# Patient Record
Sex: FEMALE | Race: WHITE | NOT HISPANIC OR LATINO | Employment: FULL TIME | URBAN - METROPOLITAN AREA
[De-identification: names, ages, dates, MRNs, and addresses within clinical notes are randomized per-mention and may not be internally consistent; named-entity substitution may affect disease eponyms.]

---

## 2019-08-09 ENCOUNTER — OFFICE VISIT (OUTPATIENT)
Dept: FAMILY MEDICINE CLINIC | Facility: CLINIC | Age: 49
End: 2019-08-09

## 2019-08-09 VITALS
RESPIRATION RATE: 16 BRPM | HEIGHT: 64 IN | TEMPERATURE: 98.8 F | HEART RATE: 95 BPM | WEIGHT: 106.8 LBS | DIASTOLIC BLOOD PRESSURE: 60 MMHG | BODY MASS INDEX: 18.23 KG/M2 | SYSTOLIC BLOOD PRESSURE: 114 MMHG | OXYGEN SATURATION: 98 %

## 2019-08-09 DIAGNOSIS — Z72.0 NICOTINE ABUSE: ICD-10-CM

## 2019-08-09 DIAGNOSIS — J90 PLEURISY WITH EFFUSION: Primary | ICD-10-CM

## 2019-08-09 DIAGNOSIS — B37.0 THRUSH, ORAL: ICD-10-CM

## 2019-08-09 PROCEDURE — 99213 OFFICE O/P EST LOW 20 MIN: CPT | Performed by: NURSE PRACTITIONER

## 2019-08-09 RX ORDER — VARENICLINE TARTRATE 0.5 MG/1
0.5 TABLET, FILM COATED ORAL 2 TIMES DAILY
Qty: 60 TABLET | Refills: 0 | Status: SHIPPED | OUTPATIENT
Start: 2019-08-09 | End: 2020-10-05

## 2019-08-09 RX ORDER — VARENICLINE TARTRATE 1 MG/1
1 TABLET, FILM COATED ORAL 2 TIMES DAILY
Qty: 60 TABLET | Refills: 5 | Status: SHIPPED | OUTPATIENT
Start: 2019-08-09 | End: 2019-09-20

## 2019-08-09 RX ORDER — DICLOFENAC SODIUM 75 MG/1
75 TABLET, DELAYED RELEASE ORAL 2 TIMES DAILY
Qty: 60 TABLET | Refills: 0 | Status: SHIPPED | OUTPATIENT
Start: 2019-08-09 | End: 2019-09-20

## 2019-08-09 RX ORDER — LEVOFLOXACIN 500 MG/1
500 TABLET, FILM COATED ORAL DAILY
Qty: 7 TABLET | Refills: 0 | Status: SHIPPED | OUTPATIENT
Start: 2019-08-09 | End: 2019-09-20

## 2019-08-09 NOTE — PROGRESS NOTES
Subjective   Radha Vasquez is a 49 y.o. female.     Chief Complaint   Patient presents with   • Establish Care   • Follow-up     [facility] 08/03/2019 for Pleurisy.         History of Present Illness   Patient Is here today to establish care with [facility] and for follow up on pleurisy.  [facility]- diagnosed with pleurisy.  Was given antibiotics and steroids,   Chest congestion is better, but pain is still there.    OTC: ibuprofen. With a little relief.    Heating pad.      Also feels like she is getting thrush.  Tongue is sore.       The following portions of the patient's history were reviewed and updated as appropriate: allergies, current medications, past family history, past medical history, past social history, past surgical history and problem list.    History reviewed. No pertinent past medical history.    Past Surgical History:   Procedure Laterality Date   • COLONOSCOPY     • TUBAL ABDOMINAL LIGATION         Family History   Problem Relation Age of Onset   • Hypertension Mother    • Osteoporosis Mother        Social History     Socioeconomic History   • Marital status: Single     Spouse name: Not on file   • Number of children: Not on file   • Years of education: Not on file   • Highest education level: Not on file   Tobacco Use   • Smoking status: Light Tobacco Smoker   • Smokeless tobacco: Never Used   Substance and Sexual Activity   • Alcohol use: Yes     Comment: rare       Review of Systems   Constitutional: Negative.    HENT: Positive for congestion and mouth sores. Negative for sore throat.    Respiratory: Positive for cough, chest tightness, shortness of breath and wheezing.         Chest tightness and pain in R side of lung   Cardiovascular: Negative for chest pain, palpitations and leg swelling.       Objective   Vitals:    08/09/19 1550   BP: 114/60   BP Location: Left arm   Patient Position: Sitting   Cuff Size: Adult   Pulse: 95   Resp: 16   Temp: 98.8 °F (37.1 °C)   TempSrc: Oral  "  SpO2: 98%   Weight: 48.4 kg (106 lb 12.8 oz)   Height: 162.6 cm (64.02\")     Body mass index is 18.32 kg/m².  Physical Exam   Constitutional: She appears well-developed and well-nourished.   Eyes: Conjunctivae and EOM are normal. Pupils are equal, round, and reactive to light.   Cardiovascular: Normal rate, regular rhythm, normal heart sounds and intact distal pulses.   Pulmonary/Chest: No stridor. No respiratory distress. She has wheezes. She has no rales. She exhibits tenderness.         Assessment/Plan   Radha was seen today for establish care and follow-up.    Diagnoses and all orders for this visit:    Pleurisy with effusion    Nicotine abuse    Thrush, oral    Other orders  -     levoFLOXacin (LEVAQUIN) 500 MG tablet; Take 1 tablet by mouth Daily.  -     nystatin (MYCOSTATIN) 096206 UNIT/ML suspension; Swish and swallow 5 mL 4 (Four) Times a Day.  -     diclofenac (VOLTAREN) 75 MG EC tablet; Take 1 tablet by mouth 2 (Two) Times a Day.  -     varenicline (CHANTIX) 0.5 MG tablet; Take 1 tablet by mouth 2 (Two) Times a Day.  -     varenicline (CHANTIX CONTINUING MONTH TONIA) 1 MG tablet; Take 1 tablet by mouth 2 (Two) Times a Day.                 Patient Instructions   Pleurisy  Pleurisy is irritation and swelling (inflammation) of the linings of your lungs (pleura). This can cause pain in your chest, back, or shoulder. It can also cause trouble breathing.  Follow these instructions at home:  Medicines  · Take over-the-counter and prescription medicines only as told by your doctor.  · If you were prescribed antibiotic medicine, take it as told by your doctor. Do not stop taking the antibiotic even if you start to feel better.  Activity  · Rest and return to your normal activities as told by your doctor. Ask your doctor what activities are safe for you.  · Do not drive or use heavy machinery while taking prescription pain medicine.  General instructions    · Watch for any changes in your condition.  · Take " deep breaths often, even if it is painful. This can help prevent lung problems.  · When lying down, lie on your painful side. This may help you feel less pain.  · Do not smoke. If you need help quitting, ask your doctor.  · Keep all follow-up visits as told by your doctor. This is important.  Contact a doctor if:  · You have pain that:  ? Gets worse.  ? Does not get better with medicine.  ? Lasts for more than 1 week.  · You have a fever or chills.  · You have a cough that does not get better at home.  · You have trouble breathing that does not get better at home.  · You cough up liquid that looks like pus (purulent secretions).  Get help right away if:  · Your lips, fingernails, or toenails turn dark or turn blue.  · You cough up blood.  · You have trouble breathing that gets worse.  · You are making loud noises when you breathe (wheezing) and this gets worse.  · You have pain that spreads to your neck, arms, or jaw.  · You get a rash.  · You throw up (vomit).  · You pass out (faint).  Summary  · Pleurisy is irritation and swelling (inflammation) of the linings of your lungs (pleura).  · Pleurisy can cause pain and trouble breathing.  · If you have a cough that does not get better at home, contact your doctor.  · Get help right away if you are having trouble breathing and it is getting worse.  This information is not intended to replace advice given to you by your health care provider. Make sure you discuss any questions you have with your health care provider.  Document Released: 11/30/2009 Document Revised: 09/11/2017 Document Reviewed: 09/11/2017  [employer] Interactive Patient Education © 2019 [employer]

## 2019-08-09 NOTE — PATIENT INSTRUCTIONS
Pleurisy  Pleurisy is irritation and swelling (inflammation) of the linings of your lungs (pleura). This can cause pain in your chest, back, or shoulder. It can also cause trouble breathing.  Follow these instructions at home:  Medicines  · Take over-the-counter and prescription medicines only as told by your doctor.  · If you were prescribed antibiotic medicine, take it as told by your doctor. Do not stop taking the antibiotic even if you start to feel better.  Activity  · Rest and return to your normal activities as told by your doctor. Ask your doctor what activities are safe for you.  · Do not drive or use heavy machinery while taking prescription pain medicine.  General instructions    · Watch for any changes in your condition.  · Take deep breaths often, even if it is painful. This can help prevent lung problems.  · When lying down, lie on your painful side. This may help you feel less pain.  · Do not smoke. If you need help quitting, ask your doctor.  · Keep all follow-up visits as told by your doctor. This is important.  Contact a doctor if:  · You have pain that:  ? Gets worse.  ? Does not get better with medicine.  ? Lasts for more than 1 week.  · You have a fever or chills.  · You have a cough that does not get better at home.  · You have trouble breathing that does not get better at home.  · You cough up liquid that looks like pus (purulent secretions).  Get help right away if:  · Your lips, fingernails, or toenails turn dark or turn blue.  · You cough up blood.  · You have trouble breathing that gets worse.  · You are making loud noises when you breathe (wheezing) and this gets worse.  · You have pain that spreads to your neck, arms, or jaw.  · You get a rash.  · You throw up (vomit).  · You pass out (faint).  Summary  · Pleurisy is irritation and swelling (inflammation) of the linings of your lungs (pleura).  · Pleurisy can cause pain and trouble breathing.  · If you have a cough that does not get  better at home, contact your doctor.  · Get help right away if you are having trouble breathing and it is getting worse.  This information is not intended to replace advice given to you by your health care provider. Make sure you discuss any questions you have with your health care provider.  Document Released: 11/30/2009 Document Revised: 09/11/2017 Document Reviewed: 09/11/2017  [employer] Interactive Patient Education © 2019 [employer]

## 2019-09-04 RX ORDER — FLUOXETINE 20 MG/1
20 TABLET, FILM COATED ORAL DAILY
Qty: 30 TABLET | Refills: 2 | Status: SHIPPED | OUTPATIENT
Start: 2019-09-04 | End: 2020-01-22

## 2019-09-04 RX ORDER — OMEPRAZOLE 40 MG/1
40 CAPSULE, DELAYED RELEASE ORAL DAILY
Qty: 30 CAPSULE | Refills: 2 | Status: SHIPPED | OUTPATIENT
Start: 2019-09-04 | End: 2020-01-22

## 2019-09-20 ENCOUNTER — OFFICE VISIT (OUTPATIENT)
Dept: FAMILY MEDICINE CLINIC | Facility: CLINIC | Age: 49
End: 2019-09-20

## 2019-09-20 VITALS
WEIGHT: 107.2 LBS | HEART RATE: 67 BPM | HEIGHT: 64 IN | SYSTOLIC BLOOD PRESSURE: 101 MMHG | DIASTOLIC BLOOD PRESSURE: 58 MMHG | BODY MASS INDEX: 18.3 KG/M2 | TEMPERATURE: 98.4 F | OXYGEN SATURATION: 98 %

## 2019-09-20 DIAGNOSIS — R09.1 PLEURISY: ICD-10-CM

## 2019-09-20 DIAGNOSIS — J40 BRONCHITIS: Primary | ICD-10-CM

## 2019-09-20 PROCEDURE — 99213 OFFICE O/P EST LOW 20 MIN: CPT | Performed by: NURSE PRACTITIONER

## 2019-09-20 NOTE — PATIENT INSTRUCTIONS
Pleurisy  Pleurisy is irritation and swelling (inflammation) of the linings of your lungs (pleura). This can cause pain in your chest, back, or shoulder. It can also cause trouble breathing.  Follow these instructions at home:  Medicines  · Take over-the-counter and prescription medicines only as told by your doctor.  · If you were prescribed antibiotic medicine, take it as told by your doctor. Do not stop taking the antibiotic even if you start to feel better.  Activity  · Rest and return to your normal activities as told by your doctor. Ask your doctor what activities are safe for you.  · Do not drive or use heavy machinery while taking prescription pain medicine.  General instructions    · Watch for any changes in your condition.  · Take deep breaths often, even if it is painful. This can help prevent lung problems.  · When lying down, lie on your painful side. This may help you feel less pain.  · Do not smoke. If you need help quitting, ask your doctor.  · Keep all follow-up visits as told by your doctor. This is important.  Contact a doctor if:  · You have pain that:  ? Gets worse.  ? Does not get better with medicine.  ? Lasts for more than 1 week.  · You have a fever or chills.  · You have a cough that does not get better at home.  · You have trouble breathing that does not get better at home.  · You cough up liquid that looks like pus (purulent secretions).  Get help right away if:  · Your lips, fingernails, or toenails turn dark or turn blue.  · You cough up blood.  · You have trouble breathing that gets worse.  · You are making loud noises when you breathe (wheezing) and this gets worse.  · You have pain that spreads to your neck, arms, or jaw.  · You get a rash.  · You throw up (vomit).  · You pass out (faint).  Summary  · Pleurisy is irritation and swelling (inflammation) of the linings of your lungs (pleura).  · Pleurisy can cause pain and trouble breathing.  · If you have a cough that does not get  better at home, contact your doctor.  · Get help right away if you are having trouble breathing and it is getting worse.  This information is not intended to replace advice given to you by your health care provider. Make sure you discuss any questions you have with your health care provider.  Document Released: 11/30/2009 Document Revised: 09/11/2017 Document Reviewed: 09/11/2017  [employer] Interactive Patient Education © 2019 [employer]      Continue use of diclofenac for muscle strain and possible pleurisy.  Will obtain x-rays of chest and call with the results.  Trial Breo for ongoing congestion and shortness of breath, as well as slight wheezes heard bilateral lung bases.  If any worsening symptoms notify provider.

## 2019-09-20 NOTE — PROGRESS NOTES
Subjective   Radha Vasquez is a 49 y.o. female.     Chief Complaint   Patient presents with   • Cough     pluracy pain, drainage, pain on right side        History of Present Illness     Patient is here today with c/o pleurisy pain that has continued.  It seems to be getting better, but has continued.  Also drainage and now having some pain in LLQ and flank pain.    Also stated toes on B feet and R fingers are going numb off and on.    Started Chantix last Saturday and has cut down smoking.  Hasn't set quit date yet.          The following portions of the patient's history were reviewed and updated as appropriate: allergies, current medications, past family history, past medical history, past social history, past surgical history and problem list.    History reviewed. No pertinent past medical history.    Past Surgical History:   Procedure Laterality Date   • COLONOSCOPY     • TUBAL ABDOMINAL LIGATION         Family History   Problem Relation Age of Onset   • Hypertension Mother    • Osteoporosis Mother        Social History     Socioeconomic History   • Marital status: Single     Spouse name: Not on file   • Number of children: Not on file   • Years of education: Not on file   • Highest education level: Not on file   Tobacco Use   • Smoking status: Light Tobacco Smoker   • Smokeless tobacco: Never Used   Substance and Sexual Activity   • Alcohol use: Yes     Comment: rare         Current Outpatient Medications:   •  estradiol (ESTRACE) 0.5 MG tablet, Take 0.5 mg by mouth Daily. as directed, Disp: , Rfl: 2  •  FLUoxetine (PROzac) 20 MG tablet, Take 1 tablet by mouth Daily., Disp: 30 tablet, Rfl: 2  •  omeprazole (priLOSEC) 40 MG capsule, Take 1 capsule by mouth Daily., Disp: 30 capsule, Rfl: 2  •  predniSONE (DELTASONE) 20 MG tablet, Take 1 tablet by mouth 2 (Two) Times a Day., Disp: 10 tablet, Rfl: 0  •  varenicline (CHANTIX) 0.5 MG tablet, Take 1 tablet by mouth 2 (Two) Times a Day., Disp: 60 tablet, Rfl:  "0  •  Fluticasone Furoate-Vilanterol (BREO ELLIPTA) 200-25 MCG/INH inhaler, Inhale 1 puff Daily., Disp: 60 each, Rfl: 2  •  vitamin D (ERGOCALCIFEROL) 78563 units capsule capsule, Take 50,000 Units by mouth 1 (One) Time Per Week., Disp: , Rfl: 0    Review of Systems   Constitutional: Positive for fatigue and fever.   HENT: Positive for congestion and rhinorrhea. Negative for ear discharge, ear pain, sneezing and sore throat.    Respiratory: Positive for chest tightness and wheezing. Negative for cough and shortness of breath.    Cardiovascular: Negative for chest pain.   Gastrointestinal: Negative for abdominal pain, constipation, diarrhea, nausea and vomiting.   Genitourinary: Positive for flank pain. Negative for dysuria, urgency and urinary incontinence.   Musculoskeletal: Negative for back pain.       Objective   Vitals:    09/20/19 0909   BP: 101/58   Pulse: 67   Temp: 98.4 °F (36.9 °C)   SpO2: 98%   Weight: 48.6 kg (107 lb 3.2 oz)   Height: 162.6 cm (64.02\")     Body mass index is 18.39 kg/m².  Physical Exam   Constitutional: She appears well-developed and well-nourished.   HENT:   Head: Normocephalic and atraumatic.   Right Ear: External ear normal.   Left Ear: External ear normal.   Nose: Nose normal.   Mouth/Throat: Oropharynx is clear and moist.   Cardiovascular: Normal rate, regular rhythm and normal heart sounds.   Pulmonary/Chest: Effort normal. No stridor. No respiratory distress. She has wheezes. She has no rales. She exhibits no tenderness.   Abdominal: Soft. Bowel sounds are normal. She exhibits no distension. There is tenderness (L flank slightly tender with palpation, muscular feeling. ).         Assessment/Plan   Radha was seen today for cough.    Diagnoses and all orders for this visit:    Bronchitis  -     XR Chest 2 View; Future    Pleurisy    Other orders  -     Fluticasone Furoate-Vilanterol (BREO ELLIPTA) 200-25 MCG/INH inhaler; Inhale 1 puff Daily.                 Patient Instructions "   Pleurisy  Pleurisy is irritation and swelling (inflammation) of the linings of your lungs (pleura). This can cause pain in your chest, back, or shoulder. It can also cause trouble breathing.  Follow these instructions at home:  Medicines  · Take over-the-counter and prescription medicines only as told by your doctor.  · If you were prescribed antibiotic medicine, take it as told by your doctor. Do not stop taking the antibiotic even if you start to feel better.  Activity  · Rest and return to your normal activities as told by your doctor. Ask your doctor what activities are safe for you.  · Do not drive or use heavy machinery while taking prescription pain medicine.  General instructions    · Watch for any changes in your condition.  · Take deep breaths often, even if it is painful. This can help prevent lung problems.  · When lying down, lie on your painful side. This may help you feel less pain.  · Do not smoke. If you need help quitting, ask your doctor.  · Keep all follow-up visits as told by your doctor. This is important.  Contact a doctor if:  · You have pain that:  ? Gets worse.  ? Does not get better with medicine.  ? Lasts for more than 1 week.  · You have a fever or chills.  · You have a cough that does not get better at home.  · You have trouble breathing that does not get better at home.  · You cough up liquid that looks like pus (purulent secretions).  Get help right away if:  · Your lips, fingernails, or toenails turn dark or turn blue.  · You cough up blood.  · You have trouble breathing that gets worse.  · You are making loud noises when you breathe (wheezing) and this gets worse.  · You have pain that spreads to your neck, arms, or jaw.  · You get a rash.  · You throw up (vomit).  · You pass out (faint).  Summary  · Pleurisy is irritation and swelling (inflammation) of the linings of your lungs (pleura).  · Pleurisy can cause pain and trouble breathing.  · If you have a cough that does not get  better at home, contact your doctor.  · Get help right away if you are having trouble breathing and it is getting worse.  This information is not intended to replace advice given to you by your health care provider. Make sure you discuss any questions you have with your health care provider.  Document Released: 11/30/2009 Document Revised: 09/11/2017 Document Reviewed: 09/11/2017  [employer] Interactive Patient Education © 2019 [employer]      Continue use of diclofenac for muscle strain and possible pleurisy.  Will obtain x-rays of chest and call with the results.  Trial Breo for ongoing congestion and shortness of breath, as well as slight wheezes heard bilateral lung bases.  If any worsening symptoms notify provider.

## 2019-12-18 RX ORDER — ESTRADIOL 0.5 MG/1
0.5 TABLET ORAL DAILY
Qty: 30 TABLET | Refills: 3 | Status: SHIPPED | OUTPATIENT
Start: 2019-12-18 | End: 2020-08-19 | Stop reason: SDUPTHER

## 2019-12-19 ENCOUNTER — OFFICE VISIT (OUTPATIENT)
Dept: FAMILY MEDICINE CLINIC | Facility: CLINIC | Age: 49
End: 2019-12-19

## 2019-12-19 VITALS
HEIGHT: 64 IN | HEART RATE: 81 BPM | DIASTOLIC BLOOD PRESSURE: 68 MMHG | BODY MASS INDEX: 18.88 KG/M2 | SYSTOLIC BLOOD PRESSURE: 134 MMHG | WEIGHT: 110.6 LBS | OXYGEN SATURATION: 97 % | TEMPERATURE: 97.8 F

## 2019-12-19 DIAGNOSIS — Z12.39 SCREENING FOR BREAST CANCER: ICD-10-CM

## 2019-12-19 DIAGNOSIS — Z12.39 ENCOUNTER FOR SCREENING FOR MALIGNANT NEOPLASM OF BREAST: ICD-10-CM

## 2019-12-19 DIAGNOSIS — S39.011A STRAIN OF MUSCLE OF RIGHT GROIN REGION: Primary | ICD-10-CM

## 2019-12-19 PROCEDURE — 99213 OFFICE O/P EST LOW 20 MIN: CPT | Performed by: NURSE PRACTITIONER

## 2019-12-19 PROCEDURE — 96372 THER/PROPH/DIAG INJ SC/IM: CPT | Performed by: NURSE PRACTITIONER

## 2019-12-19 RX ORDER — DICLOFENAC SODIUM 75 MG/1
75 TABLET, DELAYED RELEASE ORAL 2 TIMES DAILY
Qty: 60 TABLET | Refills: 2 | Status: SHIPPED | OUTPATIENT
Start: 2019-12-19 | End: 2020-08-19 | Stop reason: SDUPTHER

## 2019-12-19 RX ORDER — KETOROLAC TROMETHAMINE 30 MG/ML
60 INJECTION, SOLUTION INTRAMUSCULAR; INTRAVENOUS ONCE
Status: COMPLETED | OUTPATIENT
Start: 2019-12-19 | End: 2019-12-19

## 2019-12-19 RX ORDER — VARENICLINE TARTRATE 1 MG/1
TABLET, FILM COATED ORAL
COMMUNITY
Start: 2019-12-17 | End: 2020-10-05

## 2019-12-19 RX ORDER — CYCLOBENZAPRINE HCL 10 MG
10 TABLET ORAL 3 TIMES DAILY PRN
Qty: 30 TABLET | Refills: 0 | Status: SHIPPED | OUTPATIENT
Start: 2019-12-19 | End: 2020-08-19 | Stop reason: SDUPTHER

## 2019-12-19 RX ORDER — TRIAMCINOLONE ACETONIDE 40 MG/ML
40 INJECTION, SUSPENSION INTRA-ARTICULAR; INTRAMUSCULAR ONCE
Status: COMPLETED | OUTPATIENT
Start: 2019-12-19 | End: 2019-12-19

## 2019-12-19 RX ADMIN — KETOROLAC TROMETHAMINE 60 MG: 30 INJECTION, SOLUTION INTRAMUSCULAR; INTRAVENOUS at 14:55

## 2019-12-19 RX ADMIN — TRIAMCINOLONE ACETONIDE 40 MG: 40 INJECTION, SUSPENSION INTRA-ARTICULAR; INTRAMUSCULAR at 15:02

## 2019-12-19 NOTE — PROGRESS NOTES
Subjective   Radha Vasquez is a 49 y.o. female.     Chief Complaint   Patient presents with   • Groin Pain     pulled muscle        History of Present Illness     Patient is here today with c/o pulled muscle in groin.     She was moving furniture around trying to make room for tree and has been bothering her for 2 weeks.  Hurts at work with movement.       Ibuprofen helps to ease it.      Denies any pain to the hip.    Denies any numbness or tingling going down the leg.         The following portions of the patient's history were reviewed and updated as appropriate: allergies, current medications, past family history, past medical history, past social history, past surgical history and problem list.    Past Medical History:   Diagnosis Date   • Acute sinusitis    • Cough    • Pap smear for cervical cancer screening    • Visit for screening mammogram    • Wheezing        Past Surgical History:   Procedure Laterality Date   • COLONOSCOPY     • TUBAL ABDOMINAL LIGATION         Family History   Problem Relation Age of Onset   • Hypertension Mother    • Osteoporosis Mother        Social History     Socioeconomic History   • Marital status: Single     Spouse name: Not on file   • Number of children: Not on file   • Years of education: Not on file   • Highest education level: Not on file   Tobacco Use   • Smoking status: Light Tobacco Smoker   • Smokeless tobacco: Never Used   Substance and Sexual Activity   • Alcohol use: Yes     Comment: rare         Current Outpatient Medications:   •  estradiol (ESTRACE) 0.5 MG tablet, Take 1 tablet by mouth Daily. as directed, Disp: 30 tablet, Rfl: 3  •  FLUoxetine (PROzac) 20 MG tablet, Take 1 tablet by mouth Daily., Disp: 30 tablet, Rfl: 2  •  Fluticasone Furoate-Vilanterol (BREO ELLIPTA) 200-25 MCG/INH inhaler, Inhale 1 puff Daily. (Patient taking differently: Inhale 1 puff As Needed.), Disp: 60 each, Rfl: 2  •  omeprazole (priLOSEC) 40 MG capsule, Take 1 capsule by mouth  "Daily., Disp: 30 capsule, Rfl: 2  •  CHANTIX 1 MG tablet, , Disp: , Rfl:   •  cyclobenzaprine (FLEXERIL) 10 MG tablet, Take 1 tablet by mouth 3 (Three) Times a Day As Needed for Muscle Spasms., Disp: 30 tablet, Rfl: 0  •  diclofenac (VOLTAREN) 75 MG EC tablet, Take 1 tablet by mouth 2 (Two) Times a Day., Disp: 60 tablet, Rfl: 2  •  varenicline (CHANTIX) 0.5 MG tablet, Take 1 tablet by mouth 2 (Two) Times a Day., Disp: 60 tablet, Rfl: 0  •  vitamin D (ERGOCALCIFEROL) 93155 units capsule capsule, Take 50,000 Units by mouth 1 (One) Time Per Week., Disp: , Rfl: 0  No current facility-administered medications for this visit.     Review of Systems   Constitutional: Negative for fatigue and fever.   Respiratory: Negative for cough, shortness of breath and wheezing.    Cardiovascular: Negative for chest pain.       Objective   Vitals:    12/19/19 1351   BP: 134/68   Pulse: 81   Temp: 97.8 °F (36.6 °C)   SpO2: 97%   Weight: 50.2 kg (110 lb 9.6 oz)   Height: 162.6 cm (64.02\")     Body mass index is 18.97 kg/m².  Physical Exam   Constitutional: She is oriented to person, place, and time. She appears well-developed and well-nourished.   Cardiovascular: Normal rate, regular rhythm and normal heart sounds.   Pulmonary/Chest: Effort normal and breath sounds normal.   Musculoskeletal:        Right hip: She exhibits decreased range of motion.        Legs:  Neurological: She is alert and oriented to person, place, and time.         Assessment/Plan   Radha was seen today for groin pain.    Diagnoses and all orders for this visit:    Strain of muscle of right groin region  -     ketorolac (TORADOL) injection 60 mg  -     triamcinolone acetonide (KENALOG-40) injection 40 mg  -     diclofenac (VOLTAREN) 75 MG EC tablet; Take 1 tablet by mouth 2 (Two) Times a Day.  -     cyclobenzaprine (FLEXERIL) 10 MG tablet; Take 1 tablet by mouth 3 (Three) Times a Day As Needed for Muscle Spasms.    Screening for breast cancer    Encounter for " screening for malignant neoplasm of breast  -     Mammo Screening Bilateral With CAD; Future                 Patient Instructions   Adductor Muscle Strain With Rehab  Ask your health care provider which exercises are safe for you. Do exercises exactly as told by your health care provider and adjust them as directed. It is normal to feel mild stretching, pulling, tightness, or discomfort as you do these exercises, but you should stop right away if you feel sudden pain or your pain gets worse. Do not begin these exercises until told by your health care provider.  Strengthening exercises  These exercises build strength and endurance in your thigh. Endurance is the ability to use your muscles for a long time, even after your muscles get tired.  Exercise A: Hip adductor isometrics    1. Sit on a firm chair that positions your knees at about the same height as your hips.  2. Place a large ball, firm pillow, or rolled-up bath towel between your thighs.  3. Squeeze your thighs together, gradually building tension.  4. Hold for __________ seconds.  5. Release the tension gradually. Allow your inner thigh muscles to relax completely before you start the next repetition.  Repeat __________ times. Complete this exercise __________ times a day.  Exercise B: Straight leg raises (hip adductors)    1. Lie on your side so your head, shoulder, knee, and hip are in a straight line with each other. To help maintain your balance, you may put the foot of your top leg in front of the leg that is on the floor. Your left / right leg should be on the bottom.  2. Roll your hips slightly forward so your hips are stacked directly over each other and your left / right knee is facing forward.  3. Tense the muscles of your inner thigh and lift your bottom leg 4-6 inches (10-15 cm).  4. Hold this position for __________ seconds.  5. Slowly lower your leg to the starting position.  6. Allow the muscles to relax completely before you start the next  repetition.  Repeat __________ times. Complete this exercise __________ times a day.  Exercise C: Straight leg raises (hip extensors)    1. Lie on your belly on a bed or a firm surface with a pillow under your hips.  2. Tense your buttock muscles and lift your left / right thigh off the bed. Your left / right knee can be bent or straight, but do not let your back arch.  3. Hold this position for __________ seconds.  4. Slowly return to the starting position.  5. Allow your muscles to relax completely before you start the next repetition.  Repeat __________ times. Complete this exercise __________ times a day.  Balance exercises  These exercises improve or maintain your balance. Balance is important in preventing falls.  Exercise D: Single-leg balance  1. Stand near a railing or in a door frame that you can hold onto as needed.  2. Stand on your left / right foot. Keep your big toe down on the floor and try to keep your arch lifted.  3. If this is too easy, you can stand with your eyes closed or stand on a pillow.  4. Hold this position for __________ seconds.  Repeat __________ times. Complete this exercise __________ times a day.  Exercise E: Side lunges  1. Stand with your feet together.  2. Keeping one foot in place, step to the side with your other foot about __________ inches (__________ cm). Do not step so far that you feel discomfort.  3. Push off from your stepping foot to return to the starting position.  Repeat __________ times on each leg. Complete this exercise __________ times a day.  This information is not intended to replace advice given to you by your health care provider. Make sure you discuss any questions you have with your health care provider.  Document Released: 12/18/2006 Document Revised: 06/04/2018 Document Reviewed: 09/20/2016  Elsevier Interactive Patient Education © 2019 [employer]

## 2019-12-19 NOTE — PATIENT INSTRUCTIONS
Adductor Muscle Strain With Rehab  Ask your health care provider which exercises are safe for you. Do exercises exactly as told by your health care provider and adjust them as directed. It is normal to feel mild stretching, pulling, tightness, or discomfort as you do these exercises, but you should stop right away if you feel sudden pain or your pain gets worse. Do not begin these exercises until told by your health care provider.  Strengthening exercises  These exercises build strength and endurance in your thigh. Endurance is the ability to use your muscles for a long time, even after your muscles get tired.  Exercise A: Hip adductor isometrics    1. Sit on a firm chair that positions your knees at about the same height as your hips.  2. Place a large ball, firm pillow, or rolled-up bath towel between your thighs.  3. Squeeze your thighs together, gradually building tension.  4. Hold for __________ seconds.  5. Release the tension gradually. Allow your inner thigh muscles to relax completely before you start the next repetition.  Repeat __________ times. Complete this exercise __________ times a day.  Exercise B: Straight leg raises (hip adductors)    1. Lie on your side so your head, shoulder, knee, and hip are in a straight line with each other. To help maintain your balance, you may put the foot of your top leg in front of the leg that is on the floor. Your left / right leg should be on the bottom.  2. Roll your hips slightly forward so your hips are stacked directly over each other and your left / right knee is facing forward.  3. Tense the muscles of your inner thigh and lift your bottom leg 4-6 inches (10-15 cm).  4. Hold this position for __________ seconds.  5. Slowly lower your leg to the starting position.  6. Allow the muscles to relax completely before you start the next repetition.  Repeat __________ times. Complete this exercise __________ times a day.  Exercise C: Straight leg raises (hip  extensors)    1. Lie on your belly on a bed or a firm surface with a pillow under your hips.  2. Tense your buttock muscles and lift your left / right thigh off the bed. Your left / right knee can be bent or straight, but do not let your back arch.  3. Hold this position for __________ seconds.  4. Slowly return to the starting position.  5. Allow your muscles to relax completely before you start the next repetition.  Repeat __________ times. Complete this exercise __________ times a day.  Balance exercises  These exercises improve or maintain your balance. Balance is important in preventing falls.  Exercise D: Single-leg balance  1. Stand near a railing or in a door frame that you can hold onto as needed.  2. Stand on your left / right foot. Keep your big toe down on the floor and try to keep your arch lifted.  3. If this is too easy, you can stand with your eyes closed or stand on a pillow.  4. Hold this position for __________ seconds.  Repeat __________ times. Complete this exercise __________ times a day.  Exercise E: Side lunges  1. Stand with your feet together.  2. Keeping one foot in place, step to the side with your other foot about __________ inches (__________ cm). Do not step so far that you feel discomfort.  3. Push off from your stepping foot to return to the starting position.  Repeat __________ times on each leg. Complete this exercise __________ times a day.  This information is not intended to replace advice given to you by your health care provider. Make sure you discuss any questions you have with your health care provider.  Document Released: 12/18/2006 Document Revised: 06/04/2018 Document Reviewed: 09/20/2016  Elsevier Interactive Patient Education © 2019 [employer]

## 2020-01-04 ENCOUNTER — DOCUMENTATION (OUTPATIENT)
Dept: FAMILY MEDICINE CLINIC | Facility: CLINIC | Age: 50
End: 2020-01-04

## 2020-01-04 RX ORDER — PREDNISONE 20 MG/1
20 TABLET ORAL 2 TIMES DAILY
Qty: 10 TABLET | Refills: 0 | Status: SHIPPED | OUTPATIENT
Start: 2020-01-04 | End: 2020-01-09

## 2020-01-08 DIAGNOSIS — Z12.39 ENCOUNTER FOR SCREENING FOR MALIGNANT NEOPLASM OF BREAST: ICD-10-CM

## 2020-01-22 RX ORDER — OMEPRAZOLE 40 MG/1
CAPSULE, DELAYED RELEASE ORAL
Qty: 30 CAPSULE | Refills: 0 | Status: SHIPPED | OUTPATIENT
Start: 2020-01-22 | End: 2020-04-01

## 2020-01-22 RX ORDER — FLUOXETINE 20 MG/1
TABLET, FILM COATED ORAL
Qty: 30 TABLET | Refills: 0 | Status: SHIPPED | OUTPATIENT
Start: 2020-01-22 | End: 2020-04-01

## 2020-03-30 RX ORDER — CEFDINIR 300 MG/1
300 CAPSULE ORAL 2 TIMES DAILY
Qty: 14 CAPSULE | Refills: 0 | Status: SHIPPED | OUTPATIENT
Start: 2020-03-30 | End: 2020-08-19

## 2020-03-30 RX ORDER — CETIRIZINE HYDROCHLORIDE, PSEUDOEPHEDRINE HYDROCHLORIDE 5; 120 MG/1; MG/1
1 TABLET, FILM COATED, EXTENDED RELEASE ORAL 2 TIMES DAILY
Qty: 60 TABLET | Refills: 0 | Status: SHIPPED | OUTPATIENT
Start: 2020-03-30 | End: 2021-04-23 | Stop reason: SDUPTHER

## 2020-04-01 RX ORDER — OMEPRAZOLE 40 MG/1
CAPSULE, DELAYED RELEASE ORAL
Qty: 30 CAPSULE | Refills: 0 | Status: SHIPPED | OUTPATIENT
Start: 2020-04-01 | End: 2020-08-19

## 2020-04-01 RX ORDER — FLUOXETINE 20 MG/1
TABLET, FILM COATED ORAL
Qty: 30 TABLET | Refills: 0 | Status: SHIPPED | OUTPATIENT
Start: 2020-04-01 | End: 2020-08-19 | Stop reason: SDUPTHER

## 2020-08-19 ENCOUNTER — OFFICE VISIT (OUTPATIENT)
Dept: FAMILY MEDICINE CLINIC | Facility: CLINIC | Age: 50
End: 2020-08-19

## 2020-08-19 VITALS
TEMPERATURE: 96.9 F | OXYGEN SATURATION: 98 % | DIASTOLIC BLOOD PRESSURE: 78 MMHG | SYSTOLIC BLOOD PRESSURE: 108 MMHG | HEART RATE: 69 BPM | BODY MASS INDEX: 19.39 KG/M2 | HEIGHT: 64 IN | WEIGHT: 113.6 LBS

## 2020-08-19 DIAGNOSIS — S16.1XXA ACUTE STRAIN OF NECK MUSCLE, INITIAL ENCOUNTER: Primary | ICD-10-CM

## 2020-08-19 DIAGNOSIS — S16.1XXA ACUTE STRAIN OF NECK MUSCLE, INITIAL ENCOUNTER: ICD-10-CM

## 2020-08-19 PROCEDURE — 99213 OFFICE O/P EST LOW 20 MIN: CPT | Performed by: NURSE PRACTITIONER

## 2020-08-19 RX ORDER — FAMOTIDINE 20 MG/1
20 TABLET, FILM COATED ORAL 2 TIMES DAILY
Qty: 60 TABLET | Refills: 2 | Status: SHIPPED | OUTPATIENT
Start: 2020-08-19 | End: 2020-12-17 | Stop reason: SDUPTHER

## 2020-08-19 RX ORDER — ESTRADIOL 0.5 MG/1
0.5 TABLET ORAL DAILY
Qty: 30 TABLET | Refills: 3 | Status: SHIPPED | OUTPATIENT
Start: 2020-08-19 | End: 2020-12-17 | Stop reason: SDUPTHER

## 2020-08-19 RX ORDER — FLUOXETINE 20 MG/1
20 TABLET, FILM COATED ORAL DAILY
Qty: 30 TABLET | Refills: 2 | Status: SHIPPED | OUTPATIENT
Start: 2020-08-19 | End: 2020-12-17 | Stop reason: SDUPTHER

## 2020-08-19 RX ORDER — DICLOFENAC SODIUM 75 MG/1
75 TABLET, DELAYED RELEASE ORAL 2 TIMES DAILY
Qty: 60 TABLET | Refills: 2 | Status: SHIPPED | OUTPATIENT
Start: 2020-08-19 | End: 2021-08-24

## 2020-08-19 RX ORDER — CYCLOBENZAPRINE HCL 10 MG
10 TABLET ORAL 3 TIMES DAILY PRN
Qty: 30 TABLET | Refills: 2 | Status: SHIPPED | OUTPATIENT
Start: 2020-08-19 | End: 2020-12-17 | Stop reason: SDUPTHER

## 2020-08-19 NOTE — PROGRESS NOTES
Subjective   Radha Vasquez is a 50 y.o. female.     Chief Complaint   Patient presents with   • Shoulder Pain     Left sides pain that radiates down backsides, comes and goes        History of Present Illness     Patient is here today with complaint of left shoulder pain for last couple months that comes and goes.  Started when she started doing the [employer] for [employer].    Pain is shooting down L outer side of back.      Hard to get comfort or lay on.      OTC: tylenol helps some.        The following portions of the patient's history were reviewed and updated as appropriate: allergies, current medications, past family history, past medical history, past social history, past surgical history and problem list.    Past Medical History:   Diagnosis Date   • Acute sinusitis    • Cough    • Pap smear for cervical cancer screening    • Visit for screening mammogram    • Wheezing        Past Surgical History:   Procedure Laterality Date   • COLONOSCOPY     • TUBAL ABDOMINAL LIGATION         Family History   Problem Relation Age of Onset   • Hypertension Mother    • Osteoporosis Mother        Social History     Socioeconomic History   • Marital status: Single     Spouse name: Not on file   • Number of children: Not on file   • Years of education: Not on file   • Highest education level: Not on file   Tobacco Use   • Smoking status: Light Tobacco Smoker   • Smokeless tobacco: Never Used   Substance and Sexual Activity   • Alcohol use: Yes     Comment: rare         Current Outpatient Medications:   •  cetirizine-pseudoephedrine (ZyrTEC-D) 5-120 MG per 12 hr tablet, Take 1 tablet by mouth 2 (Two) Times a Day., Disp: 60 tablet, Rfl: 0  •  CHANTIX 1 MG tablet, , Disp: , Rfl:   •  cyclobenzaprine (FLEXERIL) 10 MG tablet, Take 1 tablet by mouth 3 (Three) Times a Day As Needed for Muscle Spasms., Disp: 30 tablet, Rfl: 2  •  diclofenac (VOLTAREN) 75 MG EC tablet, Take 1 tablet by mouth 2 (Two) Times a Day., Disp: 60 tablet,  "Rfl: 2  •  estradiol (ESTRACE) 0.5 MG tablet, Take 1 tablet by mouth Daily. as directed, Disp: 30 tablet, Rfl: 3  •  FLUoxetine (PROzac) 20 MG tablet, Take 1 tablet by mouth Daily., Disp: 30 tablet, Rfl: 2  •  Fluticasone Furoate-Vilanterol (BREO ELLIPTA) 200-25 MCG/INH inhaler, Inhale 1 puff Daily. (Patient taking differently: Inhale 1 puff As Needed.), Disp: 60 each, Rfl: 2  •  varenicline (CHANTIX) 0.5 MG tablet, Take 1 tablet by mouth 2 (Two) Times a Day., Disp: 60 tablet, Rfl: 0  •  vitamin D (ERGOCALCIFEROL) 06164 units capsule capsule, Take 50,000 Units by mouth 1 (One) Time Per Week., Disp: , Rfl: 0  •  famotidine (PEPCID) 20 MG tablet, Take 1 tablet by mouth 2 (Two) Times a Day., Disp: 60 tablet, Rfl: 2    Review of Systems   Constitutional: Negative for fatigue and fever.   Respiratory: Negative for cough, shortness of breath and wheezing.    Cardiovascular: Negative for chest pain.   Gastrointestinal: Negative for abdominal pain, constipation, diarrhea, nausea and vomiting.   Genitourinary: Negative for dysuria and urgency.   Musculoskeletal: Positive for neck pain.        L shoulder pain       Objective   Vitals:    08/19/20 1252   BP: 108/78   Pulse: 69   Temp: 96.9 °F (36.1 °C)   SpO2: 98%   Weight: 51.5 kg (113 lb 9.6 oz)   Height: 162.6 cm (64.02\")     Body mass index is 19.49 kg/m².  Physical Exam   Constitutional: She is oriented to person, place, and time. She appears well-developed and well-nourished.   Cardiovascular: Normal rate, regular rhythm and normal heart sounds.   Pulmonary/Chest: Effort normal and breath sounds normal.   Musculoskeletal:        Left shoulder: She exhibits tenderness (with lateral extension to the R in cervical muscle. ). She exhibits normal range of motion.        Cervical back: She exhibits decreased range of motion and tenderness (more in the left perispinal muscle with all ROM exercises. ).   Neurological: She is alert and oriented to person, place, and time.         "         Assessment/Plan   Radha was seen today for shoulder pain.    Diagnoses and all orders for this visit:    Acute strain of neck muscle, initial encounter  -     XR Spine Cervical 2 or 3 View; Future    Other orders  -     cyclobenzaprine (FLEXERIL) 10 MG tablet; Take 1 tablet by mouth 3 (Three) Times a Day As Needed for Muscle Spasms.  -     diclofenac (VOLTAREN) 75 MG EC tablet; Take 1 tablet by mouth 2 (Two) Times a Day.  -     estradiol (ESTRACE) 0.5 MG tablet; Take 1 tablet by mouth Daily. as directed  -     FLUoxetine (PROzac) 20 MG tablet; Take 1 tablet by mouth Daily.  -     famotidine (PEPCID) 20 MG tablet; Take 1 tablet by mouth 2 (Two) Times a Day.      Based on exam, pain seems to be coming from cervical region.    Start diclofenac and muscle relaxer.    Will obtain x-ray.   Discussed the use of ice 20 min increments QID and stretches given    Further recommendations based on results of x-ray           Patient Instructions   Cervical Sprain    A cervical sprain is a stretch or tear in the tissues that connect bones (ligaments) in the neck. Most neck (cervical) sprains get better in 4-6 weeks.  Follow these instructions at home:  If you have a neck collar:  · Wear it as told by your doctor. Do not take off (do not remove) the collar unless your doctor says that this is safe.  · Ask your doctor before adjusting your collar.  · If you have long hair, keep it outside of the collar.  · Ask your doctor if you may take off the collar for cleaning and bathing. If you may take off the collar:  ? Follow instructions from your doctor about how to take off the collar safely.  ? Clean the collar by wiping it with mild soap and water. Let it air-dry all the way.  ? If your collar has removable pads:  § Take the pads out every 1-2 days.  § Hand wash the pads with soap and water.  § Let the pads air-dry all the way before you put them back in the collar. Do not dry them in a clothes dryer. Do not dry them with  a hair dryer.  ? Check your skin under the collar for irritation or sores. If you see any, tell your doctor.  Managing pain, stiffness, and swelling    · Use a cervical traction device, if told by your doctor.  · If told, put heat on the affected area. Do this before exercises (physical therapy) or as often as told by your doctor. Use the heat source that your doctor recommends, such as a moist heat pack or a heating pad.  ? Place a towel between your skin and the heat source.  ? Leave the heat on for 20-30 minutes.  ? Take the heat off (remove the heat) if your skin turns bright red. This is very important if you cannot feel pain, heat, or cold. You may have a greater risk of getting burned.  · Put ice on the affected area.  ? Put ice in a plastic bag.  ? Place a towel between your skin and the bag.  ? Leave the ice on for 20 minutes, 2-3 times a day.  Activity  · Do not drive while wearing a neck collar. If you do not have a neck collar, ask your doctor if it is safe to drive.  · Do not drive or use heavy machinery while taking prescription pain medicine or muscle relaxants, unless your doctor approves.  · Do not lift anything that is heavier than 10 lb (4.5 kg) until your doctor tells you that it is safe.  · Rest as told by your doctor.  · Avoid activities that make you feel worse. Ask your doctor what activities are safe for you.  · Do exercises as told by your doctor or physical therapist.  Preventing neck sprain  · Practice good posture. Adjust your workstation to help with this, if needed.  · Exercise regularly as told by your doctor or physical therapist.  · Avoid activities that are risky or may cause a neck sprain (cervical sprain).  General instructions  · Take over-the-counter and prescription medicines only as told by your doctor.  · Do not use any products that contain nicotine or tobacco. This includes cigarettes and e-cigarettes. If you need help quitting, ask your doctor.  · Keep all follow-up  visits as told by your doctor. This is important.  Contact a doctor if:  · You have pain or other symptoms that get worse.  · You have symptoms that do not get better after 2 weeks.  · You have pain that does not get better with medicine.  · You start to have new, unexplained symptoms.  · You have sores or irritated skin from wearing your neck collar.  Get help right away if:  · You have very bad pain.  · You have any of the following in any part of your body:  ? Loss of feeling (numbness).  ? Tingling.  ? Weakness.  · You cannot move a part of your body (you have paralysis).  · Your activity level does not improve.  Summary  · A cervical sprain is a stretch or tear in the tissues that connect bones (ligaments) in the neck.  · If you have a neck (cervical) collar, do not take off the collar unless your doctor says that this is safe.  · Put ice on affected areas as told by your doctor.  · Put heat on affected areas as told by your doctor.  · Good posture and regular exercise can help prevent a neck sprain from happening again.  This information is not intended to replace advice given to you by your health care provider. Make sure you discuss any questions you have with your health care provider.  Document Released: 06/05/2009 Document Revised: 04/08/2020 Document Reviewed: 08/29/2017  Elsevier Patient Education © 2020 [employer]

## 2020-08-19 NOTE — PATIENT INSTRUCTIONS
Cervical Sprain    A cervical sprain is a stretch or tear in the tissues that connect bones (ligaments) in the neck. Most neck (cervical) sprains get better in 4-6 weeks.  Follow these instructions at home:  If you have a neck collar:  · Wear it as told by your doctor. Do not take off (do not remove) the collar unless your doctor says that this is safe.  · Ask your doctor before adjusting your collar.  · If you have long hair, keep it outside of the collar.  · Ask your doctor if you may take off the collar for cleaning and bathing. If you may take off the collar:  ? Follow instructions from your doctor about how to take off the collar safely.  ? Clean the collar by wiping it with mild soap and water. Let it air-dry all the way.  ? If your collar has removable pads:  § Take the pads out every 1-2 days.  § Hand wash the pads with soap and water.  § Let the pads air-dry all the way before you put them back in the collar. Do not dry them in a clothes dryer. Do not dry them with a hair dryer.  ? Check your skin under the collar for irritation or sores. If you see any, tell your doctor.  Managing pain, stiffness, and swelling    · Use a cervical traction device, if told by your doctor.  · If told, put heat on the affected area. Do this before exercises (physical therapy) or as often as told by your doctor. Use the heat source that your doctor recommends, such as a moist heat pack or a heating pad.  ? Place a towel between your skin and the heat source.  ? Leave the heat on for 20-30 minutes.  ? Take the heat off (remove the heat) if your skin turns bright red. This is very important if you cannot feel pain, heat, or cold. You may have a greater risk of getting burned.  · Put ice on the affected area.  ? Put ice in a plastic bag.  ? Place a towel between your skin and the bag.  ? Leave the ice on for 20 minutes, 2-3 times a day.  Activity  · Do not drive while wearing a neck collar. If you do not have a neck collar, ask  your doctor if it is safe to drive.  · Do not drive or use heavy machinery while taking prescription pain medicine or muscle relaxants, unless your doctor approves.  · Do not lift anything that is heavier than 10 lb (4.5 kg) until your doctor tells you that it is safe.  · Rest as told by your doctor.  · Avoid activities that make you feel worse. Ask your doctor what activities are safe for you.  · Do exercises as told by your doctor or physical therapist.  Preventing neck sprain  · Practice good posture. Adjust your workstation to help with this, if needed.  · Exercise regularly as told by your doctor or physical therapist.  · Avoid activities that are risky or may cause a neck sprain (cervical sprain).  General instructions  · Take over-the-counter and prescription medicines only as told by your doctor.  · Do not use any products that contain nicotine or tobacco. This includes cigarettes and e-cigarettes. If you need help quitting, ask your doctor.  · Keep all follow-up visits as told by your doctor. This is important.  Contact a doctor if:  · You have pain or other symptoms that get worse.  · You have symptoms that do not get better after 2 weeks.  · You have pain that does not get better with medicine.  · You start to have new, unexplained symptoms.  · You have sores or irritated skin from wearing your neck collar.  Get help right away if:  · You have very bad pain.  · You have any of the following in any part of your body:  ? Loss of feeling (numbness).  ? Tingling.  ? Weakness.  · You cannot move a part of your body (you have paralysis).  · Your activity level does not improve.  Summary  · A cervical sprain is a stretch or tear in the tissues that connect bones (ligaments) in the neck.  · If you have a neck (cervical) collar, do not take off the collar unless your doctor says that this is safe.  · Put ice on affected areas as told by your doctor.  · Put heat on affected areas as told by your doctor.  · Good  posture and regular exercise can help prevent a neck sprain from happening again.  This information is not intended to replace advice given to you by your health care provider. Make sure you discuss any questions you have with your health care provider.  Document Released: 06/05/2009 Document Revised: 04/08/2020 Document Reviewed: 08/29/2017  [employer] Patient Education © 2020 [employer]

## 2020-09-11 ENCOUNTER — OFFICE VISIT (OUTPATIENT)
Dept: FAMILY MEDICINE CLINIC | Facility: CLINIC | Age: 50
End: 2020-09-11

## 2020-09-11 VITALS
DIASTOLIC BLOOD PRESSURE: 68 MMHG | HEART RATE: 81 BPM | TEMPERATURE: 97.1 F | BODY MASS INDEX: 19.26 KG/M2 | WEIGHT: 112.8 LBS | SYSTOLIC BLOOD PRESSURE: 98 MMHG | OXYGEN SATURATION: 98 % | HEIGHT: 64 IN

## 2020-09-11 DIAGNOSIS — J01.01 ACUTE RECURRENT MAXILLARY SINUSITIS: Primary | ICD-10-CM

## 2020-09-11 PROCEDURE — 99213 OFFICE O/P EST LOW 20 MIN: CPT | Performed by: NURSE PRACTITIONER

## 2020-09-11 RX ORDER — AMOXICILLIN AND CLAVULANATE POTASSIUM 875; 125 MG/1; MG/1
1 TABLET, FILM COATED ORAL 2 TIMES DAILY
Qty: 14 TABLET | Refills: 0 | Status: SHIPPED | OUTPATIENT
Start: 2020-09-11 | End: 2020-10-05

## 2020-09-11 RX ORDER — PREDNISONE 20 MG/1
TABLET ORAL
Qty: 9 TABLET | Refills: 0 | Status: SHIPPED | OUTPATIENT
Start: 2020-09-11 | End: 2020-09-17

## 2020-09-11 RX ORDER — MOMETASONE FUROATE 50 UG/1
2 SPRAY, METERED NASAL DAILY
Qty: 17 G | Refills: 12 | Status: SHIPPED | OUTPATIENT
Start: 2020-09-11 | End: 2020-10-05

## 2020-09-11 NOTE — PROGRESS NOTES
Subjective   Radha Vasquez is a 50 y.o. female.     Chief Complaint   Patient presents with   • Ear Fullness   • Headache     X 1 week   • Cough     Productive Cough   • Chills   • Dizziness        History of Present Illness       Patient is here with complaint of cough and congestion for last week or so.   She has been using tylenol sinus OTC.  And Tylenol in between for headache.  She reports ear fullness bilaterally, headache, productive cough, chills, and dizziness.         The following portions of the patient's history were reviewed and updated as appropriate: allergies, current medications, past family history, past medical history, past social history, past surgical history and problem list.    Past Medical History:   Diagnosis Date   • Acute sinusitis    • Cough    • Pap smear for cervical cancer screening    • Visit for screening mammogram    • Wheezing        Past Surgical History:   Procedure Laterality Date   • COLONOSCOPY     • TUBAL ABDOMINAL LIGATION         Family History   Problem Relation Age of Onset   • Hypertension Mother    • Osteoporosis Mother        Social History     Socioeconomic History   • Marital status: Single     Spouse name: Not on file   • Number of children: Not on file   • Years of education: Not on file   • Highest education level: Not on file   Tobacco Use   • Smoking status: Light Tobacco Smoker   • Smokeless tobacco: Never Used   Substance and Sexual Activity   • Alcohol use: Yes     Comment: rare         Current Outpatient Medications:   •  cetirizine-pseudoephedrine (ZyrTEC-D) 5-120 MG per 12 hr tablet, Take 1 tablet by mouth 2 (Two) Times a Day., Disp: 60 tablet, Rfl: 0  •  CHANTIX 1 MG tablet, , Disp: , Rfl:   •  cyclobenzaprine (FLEXERIL) 10 MG tablet, Take 1 tablet by mouth 3 (Three) Times a Day As Needed for Muscle Spasms., Disp: 30 tablet, Rfl: 2  •  diclofenac (VOLTAREN) 75 MG EC tablet, Take 1 tablet by mouth 2 (Two) Times a Day., Disp: 60 tablet, Rfl: 2  •   "estradiol (ESTRACE) 0.5 MG tablet, Take 1 tablet by mouth Daily. as directed, Disp: 30 tablet, Rfl: 3  •  famotidine (PEPCID) 20 MG tablet, Take 1 tablet by mouth 2 (Two) Times a Day., Disp: 60 tablet, Rfl: 2  •  FLUoxetine (PROzac) 20 MG tablet, Take 1 tablet by mouth Daily., Disp: 30 tablet, Rfl: 2  •  Fluticasone Furoate-Vilanterol (BREO ELLIPTA) 200-25 MCG/INH inhaler, Inhale 1 puff Daily. (Patient taking differently: Inhale 1 puff As Needed.), Disp: 60 each, Rfl: 2  •  varenicline (CHANTIX) 0.5 MG tablet, Take 1 tablet by mouth 2 (Two) Times a Day., Disp: 60 tablet, Rfl: 0  •  vitamin D (ERGOCALCIFEROL) 73683 units capsule capsule, Take 50,000 Units by mouth 1 (One) Time Per Week., Disp: , Rfl: 0  •  amoxicillin-clavulanate (AUGMENTIN) 875-125 MG per tablet, Take 1 tablet by mouth 2 (Two) Times a Day., Disp: 14 tablet, Rfl: 0  •  mometasone (NASONEX) 50 MCG/ACT nasal spray, 2 sprays into the nostril(s) as directed by provider Daily., Disp: 17 g, Rfl: 12  •  predniSONE (DELTASONE) 20 MG tablet, Take 1 tablet by mouth 2 (Two) Times a Day for 3 days, THEN 1 tablet Daily for 3 days., Disp: 9 tablet, Rfl: 0    Review of Systems   Constitutional: Positive for chills and fatigue. Negative for fever.   HENT: Positive for congestion, ear pain, rhinorrhea and sinus pressure. Negative for sore throat and tinnitus.    Respiratory: Positive for cough and wheezing. Negative for shortness of breath.    Cardiovascular: Negative for chest pain.   Gastrointestinal: Positive for nausea. Negative for abdominal pain, constipation, diarrhea and vomiting.   Genitourinary: Negative for dysuria and urgency.   Skin: Negative.    Neurological: Positive for light-headedness and headache. Negative for dizziness.       Objective   Vitals:    09/11/20 1133   BP: 98/68   Pulse: 81   Temp: 97.1 °F (36.2 °C)   SpO2: 98%   Weight: 51.2 kg (112 lb 12.8 oz)   Height: 162.6 cm (64.02\")     Body mass index is 19.35 kg/m².  Physical Exam   "   Constitutional: She is oriented to person, place, and time. She appears well-developed and well-nourished.   HENT:   Head: Normocephalic and atraumatic.   Right Ear: Tympanic membrane mobility is abnormal.   Left Ear: Tympanic membrane mobility is abnormal.   Nose: Rhinorrhea and congestion present. Right sinus exhibits maxillary sinus tenderness and frontal sinus tenderness. Left sinus exhibits maxillary sinus tenderness and frontal sinus tenderness.   Mouth/Throat: Mucous membranes are normal. Posterior oropharyngeal edema present. Tonsils are 0 on the right. Tonsils are 0 on the left.   Cardiovascular: Normal rate, regular rhythm and normal heart sounds.   Pulmonary/Chest: Effort normal and breath sounds normal.   Neurological: She is alert and oriented to person, place, and time.   Psychiatric: She has a normal mood and affect. Her behavior is normal. Judgment and thought content normal.         Assessment/Plan   Radha was seen today for ear fullness, headache, cough, chills and dizziness.    Diagnoses and all orders for this visit:    Acute recurrent maxillary sinusitis    Other orders  -     predniSONE (DELTASONE) 20 MG tablet; Take 1 tablet by mouth 2 (Two) Times a Day for 3 days, THEN 1 tablet Daily for 3 days.  -     amoxicillin-clavulanate (AUGMENTIN) 875-125 MG per tablet; Take 1 tablet by mouth 2 (Two) Times a Day.  -     mometasone (NASONEX) 50 MCG/ACT nasal spray; 2 sprays into the nostril(s) as directed by provider Daily.      Start antibiotic and steroid.  I discussed with patient that steroids can suppress immune system.  Patient verbalizes understanding.  She wants to go ahead with this.  I would like her to start nasal spray daily for allergies.  Patient verbalizes understanding.  She will follow-up as needed.           Patient Instructions   Sinusitis, Adult  Sinusitis is soreness and swelling (inflammation) of your sinuses. Sinuses are hollow spaces in the bones around your face. They are  located:  · Around your eyes.  · In the middle of your forehead.  · Behind your nose.  · In your cheekbones.  Your sinuses and nasal passages are lined with a fluid called mucus. Mucus drains out of your sinuses. Swelling can trap mucus in your sinuses. This lets germs (bacteria, virus, or fungus) grow, which leads to infection. Most of the time, this condition is caused by a virus.  What are the causes?  This condition is caused by:  · Allergies.  · Asthma.  · Germs.  · Things that block your nose or sinuses.  · Growths in the nose (nasal polyps).  · Chemicals or irritants in the air.  · Fungus (rare).  What increases the risk?  You are more likely to develop this condition if:  · You have a weak body defense system (immune system).  · You do a lot of swimming or diving.  · You use nasal sprays too much.  · You smoke.  What are the signs or symptoms?  The main symptoms of this condition are pain and a feeling of pressure around the sinuses. Other symptoms include:  · Stuffy nose (congestion).  · Runny nose (drainage).  · Swelling and warmth in the sinuses.  · Headache.  · Toothache.  · A cough that may get worse at night.  · Mucus that collects in the throat or the back of the nose (postnasal drip).  · Being unable to smell and taste.  · Being very tired (fatigue).  · A fever.  · Sore throat.  · Bad breath.  How is this diagnosed?  This condition is diagnosed based on:  · Your symptoms.  · Your medical history.  · A physical exam.  · Tests to find out if your condition is short-term (acute) or long-term (chronic). Your doctor may:  ? Check your nose for growths (polyps).  ? Check your sinuses using a tool that has a light (endoscope).  ? Check for allergies or germs.  ? Do imaging tests, such as an MRI or CT scan.  How is this treated?  Treatment for this condition depends on the cause and whether it is short-term or long-term.  · If caused by a virus, your symptoms should go away on their own within 10 days. You  may be given medicines to relieve symptoms. They include:  ? Medicines that shrink swollen tissue in the nose.  ? Medicines that treat allergies (antihistamines).  ? A spray that treats swelling of the nostrils.   ? Rinses that help get rid of thick mucus in your nose (nasal saline washes).  · If caused by bacteria, your doctor may wait to see if you will get better without treatment. You may be given antibiotic medicine if you have:  ? A very bad infection.  ? A weak body defense system.  · If caused by growths in the nose, you may need to have surgery.  Follow these instructions at home:  Medicines  · Take, use, or apply over-the-counter and prescription medicines only as told by your doctor. These may include nasal sprays.  · If you were prescribed an antibiotic medicine, take it as told by your doctor. Do not stop taking the antibiotic even if you start to feel better.  Hydrate and humidify    · Drink enough water to keep your pee (urine) pale yellow.  · Use a cool mist humidifier to keep the humidity level in your home above 50%.  · Breathe in steam for 10-15 minutes, 3-4 times a day, or as told by your doctor. You can do this in the bathroom while a hot shower is running.  · Try not to spend time in cool or dry air.  Rest  · Rest as much as you can.  · Sleep with your head raised (elevated).  · Make sure you get enough sleep each night.  General instructions    · Put a warm, moist washcloth on your face 3-4 times a day, or as often as told by your doctor. This will help with discomfort.  · Wash your hands often with soap and water. If there is no soap and water, use hand .  · Do not smoke. Avoid being around people who are smoking (secondhand smoke).  · Keep all follow-up visits as told by your doctor. This is important.  Contact a doctor if:  · You have a fever.  · Your symptoms get worse.  · Your symptoms do not get better within 10 days.  Get help right away if:  · You have a very bad  headache.  · You cannot stop throwing up (vomiting).  · You have very bad pain or swelling around your face or eyes.  · You have trouble seeing.  · You feel confused.  · Your neck is stiff.  · You have trouble breathing.  Summary  · Sinusitis is swelling of your sinuses. Sinuses are hollow spaces in the bones around your face.  · This condition is caused by tissues in your nose that become inflamed or swollen. This traps germs. These can lead to infection.  · If you were prescribed an antibiotic medicine, take it as told by your doctor. Do not stop taking it even if you start to feel better.  · Keep all follow-up visits as told by your doctor. This is important.  This information is not intended to replace advice given to you by your health care provider. Make sure you discuss any questions you have with your health care provider.  Document Released: 06/05/2009 Document Revised: 05/20/2019 Document Reviewed: 05/20/2019  [employer] Patient Education © 2020 [employer]

## 2020-09-11 NOTE — PATIENT INSTRUCTIONS
Sinusitis, Adult  Sinusitis is soreness and swelling (inflammation) of your sinuses. Sinuses are hollow spaces in the bones around your face. They are located:  · Around your eyes.  · In the middle of your forehead.  · Behind your nose.  · In your cheekbones.  Your sinuses and nasal passages are lined with a fluid called mucus. Mucus drains out of your sinuses. Swelling can trap mucus in your sinuses. This lets germs (bacteria, virus, or fungus) grow, which leads to infection. Most of the time, this condition is caused by a virus.  What are the causes?  This condition is caused by:  · Allergies.  · Asthma.  · Germs.  · Things that block your nose or sinuses.  · Growths in the nose (nasal polyps).  · Chemicals or irritants in the air.  · Fungus (rare).  What increases the risk?  You are more likely to develop this condition if:  · You have a weak body defense system (immune system).  · You do a lot of swimming or diving.  · You use nasal sprays too much.  · You smoke.  What are the signs or symptoms?  The main symptoms of this condition are pain and a feeling of pressure around the sinuses. Other symptoms include:  · Stuffy nose (congestion).  · Runny nose (drainage).  · Swelling and warmth in the sinuses.  · Headache.  · Toothache.  · A cough that may get worse at night.  · Mucus that collects in the throat or the back of the nose (postnasal drip).  · Being unable to smell and taste.  · Being very tired (fatigue).  · A fever.  · Sore throat.  · Bad breath.  How is this diagnosed?  This condition is diagnosed based on:  · Your symptoms.  · Your medical history.  · A physical exam.  · Tests to find out if your condition is short-term (acute) or long-term (chronic). Your doctor may:  ? Check your nose for growths (polyps).  ? Check your sinuses using a tool that has a light (endoscope).  ? Check for allergies or germs.  ? Do imaging tests, such as an MRI or CT scan.  How is this treated?  Treatment for this condition  depends on the cause and whether it is short-term or long-term.  · If caused by a virus, your symptoms should go away on their own within 10 days. You may be given medicines to relieve symptoms. They include:  ? Medicines that shrink swollen tissue in the nose.  ? Medicines that treat allergies (antihistamines).  ? A spray that treats swelling of the nostrils.   ? Rinses that help get rid of thick mucus in your nose (nasal saline washes).  · If caused by bacteria, your doctor may wait to see if you will get better without treatment. You may be given antibiotic medicine if you have:  ? A very bad infection.  ? A weak body defense system.  · If caused by growths in the nose, you may need to have surgery.  Follow these instructions at home:  Medicines  · Take, use, or apply over-the-counter and prescription medicines only as told by your doctor. These may include nasal sprays.  · If you were prescribed an antibiotic medicine, take it as told by your doctor. Do not stop taking the antibiotic even if you start to feel better.  Hydrate and humidify    · Drink enough water to keep your pee (urine) pale yellow.  · Use a cool mist humidifier to keep the humidity level in your home above 50%.  · Breathe in steam for 10-15 minutes, 3-4 times a day, or as told by your doctor. You can do this in the bathroom while a hot shower is running.  · Try not to spend time in cool or dry air.  Rest  · Rest as much as you can.  · Sleep with your head raised (elevated).  · Make sure you get enough sleep each night.  General instructions    · Put a warm, moist washcloth on your face 3-4 times a day, or as often as told by your doctor. This will help with discomfort.  · Wash your hands often with soap and water. If there is no soap and water, use hand .  · Do not smoke. Avoid being around people who are smoking (secondhand smoke).  · Keep all follow-up visits as told by your doctor. This is important.  Contact a doctor if:  · You  have a fever.  · Your symptoms get worse.  · Your symptoms do not get better within 10 days.  Get help right away if:  · You have a very bad headache.  · You cannot stop throwing up (vomiting).  · You have very bad pain or swelling around your face or eyes.  · You have trouble seeing.  · You feel confused.  · Your neck is stiff.  · You have trouble breathing.  Summary  · Sinusitis is swelling of your sinuses. Sinuses are hollow spaces in the bones around your face.  · This condition is caused by tissues in your nose that become inflamed or swollen. This traps germs. These can lead to infection.  · If you were prescribed an antibiotic medicine, take it as told by your doctor. Do not stop taking it even if you start to feel better.  · Keep all follow-up visits as told by your doctor. This is important.  This information is not intended to replace advice given to you by your health care provider. Make sure you discuss any questions you have with your health care provider.  Document Released: 06/05/2009 Document Revised: 05/20/2019 Document Reviewed: 05/20/2019  [employer] Patient Education © 2020 [employer]

## 2020-10-05 ENCOUNTER — TELEMEDICINE (OUTPATIENT)
Dept: FAMILY MEDICINE CLINIC | Facility: CLINIC | Age: 50
End: 2020-10-05

## 2020-10-05 DIAGNOSIS — R22.1 NODULE OF NECK: Primary | ICD-10-CM

## 2020-10-05 PROCEDURE — 99213 OFFICE O/P EST LOW 20 MIN: CPT | Performed by: NURSE PRACTITIONER

## 2020-10-05 NOTE — PROGRESS NOTES
You have chosen to receive care through a telehealth visit.  Do you consent to use a video/audio connection for your medical care today? Yes      Subjective   Radha Vasquez is a 50 y.o. female.     Chief Complaint   Patient presents with   • Mass     on throat        History of Present Illness     Patient presents via tele-health visit with complaint of mass on throat.    She first noticed about a week ago.  Right below chin.  Denies any pain.     Not having any trouble swallowing.       She is unable to get her video visit to work so she is going to come by the office sometime this afternoon.      The following portions of the patient's history were reviewed and updated as appropriate: allergies, current medications, past family history, past medical history, past social history, past surgical history and problem list.    Past Medical History:   Diagnosis Date   • Acute sinusitis    • Cough    • Pap smear for cervical cancer screening    • Visit for screening mammogram    • Wheezing        Past Surgical History:   Procedure Laterality Date   • COLONOSCOPY     • TUBAL ABDOMINAL LIGATION         Family History   Problem Relation Age of Onset   • Hypertension Mother    • Osteoporosis Mother        Social History     Socioeconomic History   • Marital status: Single     Spouse name: Not on file   • Number of children: Not on file   • Years of education: Not on file   • Highest education level: Not on file   Tobacco Use   • Smoking status: Light Tobacco Smoker   • Smokeless tobacco: Never Used   Substance and Sexual Activity   • Alcohol use: Yes     Comment: rare         Current Outpatient Medications:   •  cetirizine-pseudoephedrine (ZyrTEC-D) 5-120 MG per 12 hr tablet, Take 1 tablet by mouth 2 (Two) Times a Day., Disp: 60 tablet, Rfl: 0  •  cyclobenzaprine (FLEXERIL) 10 MG tablet, Take 1 tablet by mouth 3 (Three) Times a Day As Needed for Muscle Spasms., Disp: 30 tablet, Rfl: 2  •  diclofenac (VOLTAREN) 75 MG  EC tablet, Take 1 tablet by mouth 2 (Two) Times a Day., Disp: 60 tablet, Rfl: 2  •  estradiol (ESTRACE) 0.5 MG tablet, Take 1 tablet by mouth Daily. as directed, Disp: 30 tablet, Rfl: 3  •  famotidine (PEPCID) 20 MG tablet, Take 1 tablet by mouth 2 (Two) Times a Day., Disp: 60 tablet, Rfl: 2  •  FLUoxetine (PROzac) 20 MG tablet, Take 1 tablet by mouth Daily., Disp: 30 tablet, Rfl: 2    Review of Systems   Constitutional: Negative for fatigue and fever.   HENT: Negative for congestion, rhinorrhea, sinus pressure, sneezing, sore throat, swollen glands, tinnitus and trouble swallowing.         Swelling in left side of throat.     Respiratory: Negative for cough, shortness of breath and wheezing.    Cardiovascular: Negative for chest pain.   Gastrointestinal: Negative for abdominal pain, constipation, diarrhea, nausea and vomiting.   Genitourinary: Negative for dysuria and urgency.   Skin: Negative.    Neurological: Negative for dizziness and headache.   Psychiatric/Behavioral: Negative for suicidal ideas and depressed mood. The patient is not nervous/anxious.        Objective   There were no vitals filed for this visit.  There is no height or weight on file to calculate BMI.  Physical Exam  Neck:      Musculoskeletal: Neck supple. No neck rigidity or muscular tenderness.      Thyroid: Thyroid mass present. No thyroid tenderness.     Skin:     General: Skin is warm and dry.   Neurological:      Mental Status: She is alert and oriented to person, place, and time.   Psychiatric:         Mood and Affect: Mood normal.         Behavior: Behavior normal.         Thought Content: Thought content normal.         Judgment: Judgment normal.           Assessment/Plan   Radha was seen today for mass.    Diagnoses and all orders for this visit:    Nodule of neck  -     US Thyroid; Future  -     TSH  -     T4, free  -     Thyroid Stimulating Immunoglobulin  -     CBC & Differential  -     Comprehensive metabolic panel  -     US  Head Neck Soft Tissue; Future      Will obtain labs and US and call with results and any changes needed to plan of care.  If any pain starts to occur, notify provider.               There are no Patient Instructions on file for this visit.

## 2020-10-07 LAB
ALBUMIN SERPL-MCNC: 4.4 G/DL (ref 3.8–4.8)
ALBUMIN/GLOB SERPL: 1.8 {RATIO} (ref 1.2–2.2)
ALP SERPL-CCNC: 93 IU/L (ref 39–117)
ALT SERPL-CCNC: 8 IU/L (ref 0–32)
AST SERPL-CCNC: 14 IU/L (ref 0–40)
BASOPHILS # BLD AUTO: 0.1 X10E3/UL (ref 0–0.2)
BASOPHILS NFR BLD AUTO: 1 %
BILIRUB SERPL-MCNC: 0.2 MG/DL (ref 0–1.2)
BUN SERPL-MCNC: 11 MG/DL (ref 6–24)
BUN/CREAT SERPL: 12 (ref 9–23)
CALCIUM SERPL-MCNC: 9.4 MG/DL (ref 8.7–10.2)
CHLORIDE SERPL-SCNC: 106 MMOL/L (ref 96–106)
CO2 SERPL-SCNC: 26 MMOL/L (ref 20–29)
CREAT SERPL-MCNC: 0.9 MG/DL (ref 0.57–1)
EOSINOPHIL # BLD AUTO: 0.3 X10E3/UL (ref 0–0.4)
EOSINOPHIL NFR BLD AUTO: 4 %
ERYTHROCYTE [DISTWIDTH] IN BLOOD BY AUTOMATED COUNT: 12.9 % (ref 11.7–15.4)
GLOBULIN SER CALC-MCNC: 2.4 G/DL (ref 1.5–4.5)
GLUCOSE SERPL-MCNC: 86 MG/DL (ref 65–99)
HCT VFR BLD AUTO: 40.1 % (ref 34–46.6)
HGB BLD-MCNC: 13.3 G/DL (ref 11.1–15.9)
IMM GRANULOCYTES # BLD AUTO: 0 X10E3/UL (ref 0–0.1)
IMM GRANULOCYTES NFR BLD AUTO: 0 %
LYMPHOCYTES # BLD AUTO: 2.8 X10E3/UL (ref 0.7–3.1)
LYMPHOCYTES NFR BLD AUTO: 44 %
MCH RBC QN AUTO: 31.4 PG (ref 26.6–33)
MCHC RBC AUTO-ENTMCNC: 33.2 G/DL (ref 31.5–35.7)
MCV RBC AUTO: 95 FL (ref 79–97)
MONOCYTES # BLD AUTO: 0.5 X10E3/UL (ref 0.1–0.9)
MONOCYTES NFR BLD AUTO: 7 %
NEUTROPHILS # BLD AUTO: 2.8 X10E3/UL (ref 1.4–7)
NEUTROPHILS NFR BLD AUTO: 44 %
PLATELET # BLD AUTO: 338 X10E3/UL (ref 150–450)
POTASSIUM SERPL-SCNC: 4.6 MMOL/L (ref 3.5–5.2)
PROT SERPL-MCNC: 6.8 G/DL (ref 6–8.5)
RBC # BLD AUTO: 4.23 X10E6/UL (ref 3.77–5.28)
SODIUM SERPL-SCNC: 143 MMOL/L (ref 134–144)
T4 FREE SERPL-MCNC: 1.12 NG/DL (ref 0.82–1.77)
TSH SERPL DL<=0.005 MIU/L-ACNC: 0.72 UIU/ML (ref 0.45–4.5)
TSI SER-ACNC: <0.1 IU/L (ref 0–0.55)
WBC # BLD AUTO: 6.5 X10E3/UL (ref 3.4–10.8)

## 2020-10-16 ENCOUNTER — TELEPHONE (OUTPATIENT)
Dept: FAMILY MEDICINE CLINIC | Facility: CLINIC | Age: 50
End: 2020-10-16

## 2020-10-16 NOTE — TELEPHONE ENCOUNTER
PATIENT STATED SHE HAS NOT SCHEDULED HER US OF THE THYROID AND NECK YET. SHE JUST GOT THE PAPER IN THE MAIL YESTERDAY. SHE WILL CALL THEM TODAY TO SCHEDULE IT.

## 2020-10-21 ENCOUNTER — HOSPITAL ENCOUNTER (OUTPATIENT)
Dept: ULTRASOUND IMAGING | Facility: HOSPITAL | Age: 50
Discharge: HOME | End: 2020-10-21
Admitting: NURSE PRACTITIONER

## 2020-10-21 DIAGNOSIS — R22.1 NODULE OF NECK: ICD-10-CM

## 2020-10-21 DIAGNOSIS — R93.89 ABNORMAL ULTRASOUND OF NECK: ICD-10-CM

## 2020-10-21 DIAGNOSIS — R93.89 ABNORMAL CT SCAN, NECK: Primary | ICD-10-CM

## 2020-10-21 PROCEDURE — 76536 US EXAM OF HEAD AND NECK: CPT

## 2020-10-21 NOTE — PROGRESS NOTES
I called and discussed with patient the results of that her ultrasound of her head and neck.  It is recommending that she have a CT scan with contrast for further evaluation.  I will get that ordered call her with the results as soon as possible.  Patient verbalizes understanding.

## 2020-10-28 ENCOUNTER — HOSPITAL ENCOUNTER (OUTPATIENT)
Dept: CT IMAGING | Facility: HOSPITAL | Age: 50
Discharge: HOME | End: 2020-10-28
Admitting: NURSE PRACTITIONER

## 2020-10-28 DIAGNOSIS — Q89.2 THYROGLOSSAL DUCT CYST: Primary | ICD-10-CM

## 2020-10-28 DIAGNOSIS — R93.89 ABNORMAL ULTRASOUND OF NECK: ICD-10-CM

## 2020-10-28 PROCEDURE — 25010000002 IOPAMIDOL 61 % SOLUTION: Performed by: NURSE PRACTITIONER

## 2020-10-28 PROCEDURE — 70492 CT SFT TSUE NCK W/O & W/DYE: CPT

## 2020-10-28 RX ADMIN — IOPAMIDOL 75 ML: 612 INJECTION, SOLUTION INTRAVENOUS at 15:28

## 2020-12-17 ENCOUNTER — OFFICE VISIT (OUTPATIENT)
Dept: FAMILY MEDICINE CLINIC | Facility: CLINIC | Age: 50
End: 2020-12-17

## 2020-12-17 VITALS
BODY MASS INDEX: 18.88 KG/M2 | SYSTOLIC BLOOD PRESSURE: 114 MMHG | TEMPERATURE: 98.6 F | HEIGHT: 64 IN | OXYGEN SATURATION: 91 % | HEART RATE: 67 BPM | WEIGHT: 110.6 LBS | DIASTOLIC BLOOD PRESSURE: 66 MMHG

## 2020-12-17 DIAGNOSIS — M54.50 LOW BACK PAIN, UNSPECIFIED BACK PAIN LATERALITY, UNSPECIFIED CHRONICITY, UNSPECIFIED WHETHER SCIATICA PRESENT: Primary | ICD-10-CM

## 2020-12-17 LAB
BILIRUB BLD-MCNC: NEGATIVE MG/DL
GLUCOSE UR STRIP-MCNC: NEGATIVE MG/DL
KETONES UR QL: NEGATIVE
LEUKOCYTE EST, POC: NEGATIVE
NITRITE UR-MCNC: NEGATIVE MG/ML
PH UR: 6 [PH] (ref 5–8)
PROT UR STRIP-MCNC: NEGATIVE MG/DL
RBC # UR STRIP: NEGATIVE /UL
SP GR UR: 1.01 (ref 1–1.03)
UROBILINOGEN UR QL: NORMAL

## 2020-12-17 PROCEDURE — 99213 OFFICE O/P EST LOW 20 MIN: CPT | Performed by: NURSE PRACTITIONER

## 2020-12-17 RX ORDER — ESTRADIOL 0.5 MG/1
0.5 TABLET ORAL DAILY
Qty: 30 TABLET | Refills: 5 | Status: SHIPPED | OUTPATIENT
Start: 2020-12-17 | End: 2022-04-28

## 2020-12-17 RX ORDER — FLUOXETINE 20 MG/1
20 TABLET, FILM COATED ORAL DAILY
Qty: 30 TABLET | Refills: 5 | Status: SHIPPED | OUTPATIENT
Start: 2020-12-17 | End: 2020-12-23

## 2020-12-17 RX ORDER — FAMOTIDINE 20 MG/1
20 TABLET, FILM COATED ORAL 2 TIMES DAILY
Qty: 60 TABLET | Refills: 5 | Status: SHIPPED | OUTPATIENT
Start: 2020-12-17 | End: 2021-04-23 | Stop reason: SDUPTHER

## 2020-12-17 RX ORDER — CYCLOBENZAPRINE HCL 10 MG
10 TABLET ORAL 3 TIMES DAILY PRN
Qty: 30 TABLET | Refills: 5 | Status: SHIPPED | OUTPATIENT
Start: 2020-12-17 | End: 2022-05-27 | Stop reason: SDUPTHER

## 2020-12-17 NOTE — PROGRESS NOTES
Subjective   Radha Vasquez is a 50 y.o. female.     Chief Complaint   Patient presents with   • Med Refill   • Back Pain        History of Present Illness     Patient is here today with complaint of lower back pain that started on Monday.  Took Apolinar back and body and didn't help.  Tuesday woke up and still hurt. Took ibuprofen and didn't help.  Tuesday night seemed worse after work.  Hasn't been doing any heavy lifting or anything.     Wednesday nothing helped.   Today is worse and feels like it is coming around on both sides to abdomen.      Denies any pain shooting in either leg.      Had slight pain with urination 2 weeks ago and then went away.     The following portions of the patient's history were reviewed and updated as appropriate: allergies, current medications, past family history, past medical history, past social history, past surgical history and problem list.    Past Medical History:   Diagnosis Date   • Acute sinusitis    • Cough    • Pap smear for cervical cancer screening    • Visit for screening mammogram    • Wheezing        Past Surgical History:   Procedure Laterality Date   • COLONOSCOPY     • TUBAL ABDOMINAL LIGATION         Family History   Problem Relation Age of Onset   • Hypertension Mother    • Osteoporosis Mother        Social History     Socioeconomic History   • Marital status: Single     Spouse name: Not on file   • Number of children: Not on file   • Years of education: Not on file   • Highest education level: Not on file   Tobacco Use   • Smoking status: Light Tobacco Smoker   • Smokeless tobacco: Never Used   Substance and Sexual Activity   • Alcohol use: Yes     Comment: rare         Current Outpatient Medications:   •  cetirizine-pseudoephedrine (ZyrTEC-D) 5-120 MG per 12 hr tablet, Take 1 tablet by mouth 2 (Two) Times a Day., Disp: 60 tablet, Rfl: 0  •  cyclobenzaprine (FLEXERIL) 10 MG tablet, Take 1 tablet by mouth 3 (Three) Times a Day As Needed for Muscle Spasms.,  "Disp: 30 tablet, Rfl: 5  •  diclofenac (VOLTAREN) 75 MG EC tablet, Take 1 tablet by mouth 2 (Two) Times a Day., Disp: 60 tablet, Rfl: 2  •  estradiol (ESTRACE) 0.5 MG tablet, Take 1 tablet by mouth Daily. as directed, Disp: 30 tablet, Rfl: 5  •  famotidine (PEPCID) 20 MG tablet, Take 1 tablet by mouth 2 (Two) Times a Day., Disp: 60 tablet, Rfl: 5  •  FLUoxetine (PROzac) 20 MG tablet, Take 1 tablet by mouth Daily., Disp: 30 tablet, Rfl: 5    Review of Systems   Constitutional: Negative for fatigue and fever.   Respiratory: Negative for cough, shortness of breath and wheezing.    Cardiovascular: Negative for chest pain.   Gastrointestinal: Negative for abdominal pain, constipation, diarrhea, nausea and vomiting.   Genitourinary: Negative for dysuria and urgency.   Musculoskeletal: Positive for back pain.   Skin: Negative.    Neurological: Negative for dizziness and headache.   Psychiatric/Behavioral: Negative for suicidal ideas and depressed mood. The patient is not nervous/anxious.        Objective   Vitals:    12/17/20 1452   BP: 114/66   Pulse: 67   Temp: 98.6 °F (37 °C)   SpO2: 91%   Weight: 50.2 kg (110 lb 9.6 oz)   Height: 162.6 cm (64\")     Body mass index is 18.98 kg/m².  Physical Exam  Constitutional:       Appearance: Normal appearance.   Cardiovascular:      Rate and Rhythm: Normal rate and regular rhythm.      Pulses: Normal pulses.   Pulmonary:      Effort: Pulmonary effort is normal.      Breath sounds: Normal breath sounds.   Musculoskeletal:      Lumbar back: She exhibits decreased range of motion and tenderness.   Skin:     General: Skin is warm and dry.   Neurological:      Mental Status: She is alert and oriented to person, place, and time.   Psychiatric:         Mood and Affect: Mood normal.         Behavior: Behavior normal.         Thought Content: Thought content normal.         Judgment: Judgment normal.           Assessment/Plan   Diagnoses and all orders for this visit:    1. Low back pain, " unspecified back pain laterality, unspecified chronicity, unspecified whether sciatica present (Primary)  -     POC Urinalysis Dipstick, Automated  -     XR Spine Lumbar 2 or 3 View; Future    Other orders  -     cyclobenzaprine (FLEXERIL) 10 MG tablet; Take 1 tablet by mouth 3 (Three) Times a Day As Needed for Muscle Spasms.  Dispense: 30 tablet; Refill: 5  -     estradiol (ESTRACE) 0.5 MG tablet; Take 1 tablet by mouth Daily. as directed  Dispense: 30 tablet; Refill: 5  -     famotidine (PEPCID) 20 MG tablet; Take 1 tablet by mouth 2 (Two) Times a Day.  Dispense: 60 tablet; Refill: 5  -     FLUoxetine (PROzac) 20 MG tablet; Take 1 tablet by mouth Daily.  Dispense: 30 tablet; Refill: 5      Would like her to obtain a lumbar x-ray for further evaluation.  Continue muscle relaxer and diclofenac.  Use ice on area and 20-minute increments up to 4 times daily.  Discussed stretches and how to do them.  Follow-up based on results.  Patient verbalizes understanding.           There are no Patient Instructions on file for this visit.

## 2020-12-23 ENCOUNTER — TELEPHONE (OUTPATIENT)
Dept: FAMILY MEDICINE CLINIC | Facility: CLINIC | Age: 50
End: 2020-12-23

## 2020-12-23 RX ORDER — FLUOXETINE HYDROCHLORIDE 20 MG/1
20 CAPSULE ORAL DAILY
Qty: 30 CAPSULE | Refills: 5 | Status: SHIPPED | OUTPATIENT
Start: 2020-12-23 | End: 2021-04-23

## 2021-03-31 ENCOUNTER — BULK ORDERING (OUTPATIENT)
Dept: CASE MANAGEMENT | Facility: OTHER | Age: 51
End: 2021-03-31

## 2021-03-31 DIAGNOSIS — Z23 IMMUNIZATION DUE: ICD-10-CM

## 2021-04-23 ENCOUNTER — OFFICE VISIT (OUTPATIENT)
Dept: FAMILY MEDICINE CLINIC | Facility: CLINIC | Age: 51
End: 2021-04-23

## 2021-04-23 VITALS
TEMPERATURE: 98.4 F | WEIGHT: 111.8 LBS | BODY MASS INDEX: 19.09 KG/M2 | OXYGEN SATURATION: 99 % | DIASTOLIC BLOOD PRESSURE: 78 MMHG | HEIGHT: 64 IN | SYSTOLIC BLOOD PRESSURE: 112 MMHG | HEART RATE: 89 BPM

## 2021-04-23 DIAGNOSIS — K21.9 GASTROESOPHAGEAL REFLUX DISEASE WITHOUT ESOPHAGITIS: ICD-10-CM

## 2021-04-23 DIAGNOSIS — J01.00 ACUTE MAXILLARY SINUSITIS, RECURRENCE NOT SPECIFIED: Primary | ICD-10-CM

## 2021-04-23 DIAGNOSIS — F41.8 ANXIETY WITH DEPRESSION: ICD-10-CM

## 2021-04-23 PROCEDURE — 99214 OFFICE O/P EST MOD 30 MIN: CPT | Performed by: NURSE PRACTITIONER

## 2021-04-23 RX ORDER — CETIRIZINE HYDROCHLORIDE, PSEUDOEPHEDRINE HYDROCHLORIDE 5; 120 MG/1; MG/1
1 TABLET, FILM COATED, EXTENDED RELEASE ORAL 2 TIMES DAILY
Qty: 60 TABLET | Refills: 0 | Status: SHIPPED | OUTPATIENT
Start: 2021-04-23 | End: 2022-01-17 | Stop reason: SDUPTHER

## 2021-04-23 RX ORDER — SULFAMETHOXAZOLE AND TRIMETHOPRIM 800; 160 MG/1; MG/1
1 TABLET ORAL 2 TIMES DAILY
Qty: 20 TABLET | Refills: 0 | Status: SHIPPED | OUTPATIENT
Start: 2021-04-23 | End: 2021-08-24

## 2021-04-23 RX ORDER — PANTOPRAZOLE SODIUM 40 MG/1
40 TABLET, DELAYED RELEASE ORAL DAILY
Qty: 90 TABLET | Refills: 1 | Status: SHIPPED | OUTPATIENT
Start: 2021-04-23 | End: 2022-01-03

## 2021-04-23 RX ORDER — FAMOTIDINE 20 MG/1
20 TABLET, FILM COATED ORAL 2 TIMES DAILY
Qty: 180 TABLET | Refills: 1 | Status: SHIPPED | OUTPATIENT
Start: 2021-04-23 | End: 2022-04-28 | Stop reason: SDUPTHER

## 2021-04-23 RX ORDER — ESCITALOPRAM OXALATE 5 MG/1
5 TABLET ORAL DAILY
Qty: 30 TABLET | Refills: 2 | Status: SHIPPED | OUTPATIENT
Start: 2021-04-23 | End: 2022-01-03

## 2021-04-23 NOTE — PROGRESS NOTES
"Chief Complaint  Sinus Problem, Allergies, and mood swings    Subjective          Radha Vasquez presents to [facility]  History of Present Illness       Patient is here today with complaint of sinus congestion and pressure for last week.  Trouble with fullness of ears.  Having a lot of coughing    After surgery: throat is better:     She states she hadn't been taking medication for mood because she couldn't express emotions.  She states that fluoxetine helped with crying and not losing cool and mood swings, but she didn't like the loss of emotions.    Gerd: still having it daily even with famotidine.        Objective   Vital Signs:   /78   Pulse 89   Temp 98.4 °F (36.9 °C)   Ht 162.6 cm (64\")   Wt 50.7 kg (111 lb 12.8 oz)   SpO2 99%   BMI 19.19 kg/m²     Physical Exam  Constitutional:       Appearance: Normal appearance.   HENT:      Head: Normocephalic and atraumatic.      Right Ear: Tympanic membrane has decreased mobility.      Left Ear: Tympanic membrane has decreased mobility.      Nose: Congestion and rhinorrhea present.      Right Sinus: Maxillary sinus tenderness and frontal sinus tenderness present.      Left Sinus: Maxillary sinus tenderness and frontal sinus tenderness present.      Mouth/Throat:      Lips: Pink.      Mouth: Mucous membranes are moist.      Pharynx: Oropharynx is clear.      Tonsils: 0 on the right. 0 on the left.      Comments: Post nasal drainage noted  Cardiovascular:      Rate and Rhythm: Normal rate and regular rhythm.      Pulses: Normal pulses.   Pulmonary:      Effort: Pulmonary effort is normal.      Breath sounds: Normal breath sounds.   Musculoskeletal:         General: Normal range of motion.   Skin:     General: Skin is warm and dry.   Neurological:      Mental Status: She is alert and oriented to person, place, and time.   Psychiatric:         Mood and Affect: Mood normal.         Behavior: Behavior normal.         Thought " Content: Thought content normal.         Judgment: Judgment normal.        Result Review :                 Assessment and Plan    Diagnoses and all orders for this visit:    1. Acute maxillary sinusitis, recurrence not specified (Primary)    2. Gastroesophageal reflux disease without esophagitis    3. Anxiety with depression    Other orders  -     pantoprazole (Protonix) 40 MG EC tablet; Take 1 tablet by mouth Daily.  Dispense: 90 tablet; Refill: 1  -     famotidine (PEPCID) 20 MG tablet; Take 1 tablet by mouth 2 (Two) Times a Day.  Dispense: 180 tablet; Refill: 1  -     cetirizine-pseudoephedrine (ZyrTEC-D) 5-120 MG per 12 hr tablet; Take 1 tablet by mouth 2 (Two) Times a Day.  Dispense: 60 tablet; Refill: 0  -     escitalopram (Lexapro) 5 MG tablet; Take 1 tablet by mouth Daily.  Dispense: 30 tablet; Refill: 2  -     sulfamethoxazole-trimethoprim (Bactrim DS) 800-160 MG per tablet; Take 1 tablet by mouth 2 (Two) Times a Day.  Dispense: 20 tablet; Refill: 0      Sinus infection: start antibiotic and sinus medication    GERD: continue famotidine.  Trial protonix. We discussed dietary considerations.       MOOD: trial lexapro. If any worsening mood notify provider.  If any SI or HI, go to ER. Follow up in 4 weeks for medication management, sooner if needed.       Follow Up   Return if symptoms worsen or fail to improve.  Patient was given instructions and counseling regarding her condition or for health maintenance advice. Please see specific information pulled into the AVS if appropriate.

## 2021-08-24 ENCOUNTER — OFFICE VISIT (OUTPATIENT)
Dept: FAMILY MEDICINE CLINIC | Facility: CLINIC | Age: 51
End: 2021-08-24

## 2021-08-24 VITALS
HEART RATE: 79 BPM | DIASTOLIC BLOOD PRESSURE: 64 MMHG | OXYGEN SATURATION: 99 % | BODY MASS INDEX: 17.99 KG/M2 | TEMPERATURE: 98.4 F | WEIGHT: 105.4 LBS | HEIGHT: 64 IN | SYSTOLIC BLOOD PRESSURE: 104 MMHG

## 2021-08-24 DIAGNOSIS — M54.50 LOW BACK PAIN, UNSPECIFIED BACK PAIN LATERALITY, UNSPECIFIED CHRONICITY, UNSPECIFIED WHETHER SCIATICA PRESENT: Primary | ICD-10-CM

## 2021-08-24 LAB
BILIRUB BLD-MCNC: ABNORMAL MG/DL
CLARITY, POC: CLEAR
COLOR UR: YELLOW
GLUCOSE UR STRIP-MCNC: NEGATIVE MG/DL
KETONES UR QL: NEGATIVE
LEUKOCYTE EST, POC: NEGATIVE
NITRITE UR-MCNC: NEGATIVE MG/ML
PH UR: 5.5 [PH] (ref 5–8)
PROT UR STRIP-MCNC: ABNORMAL MG/DL
RBC # UR STRIP: NEGATIVE /UL
SP GR UR: 1.03 (ref 1–1.03)
UROBILINOGEN UR QL: NORMAL

## 2021-08-24 PROCEDURE — 81003 URINALYSIS AUTO W/O SCOPE: CPT | Performed by: FAMILY MEDICINE

## 2021-08-24 PROCEDURE — 99213 OFFICE O/P EST LOW 20 MIN: CPT | Performed by: FAMILY MEDICINE

## 2021-08-24 RX ORDER — DICLOFENAC SODIUM 75 MG/1
75 TABLET, DELAYED RELEASE ORAL 2 TIMES DAILY
Qty: 60 TABLET | Refills: 0 | Status: SHIPPED | OUTPATIENT
Start: 2021-08-24 | End: 2022-08-26

## 2021-08-24 RX ORDER — METAXALONE 800 MG/1
400-800 TABLET ORAL 3 TIMES DAILY PRN
Qty: 30 TABLET | Refills: 0 | Status: SHIPPED | OUTPATIENT
Start: 2021-08-24 | End: 2022-05-27

## 2021-08-24 NOTE — PROGRESS NOTES
"Chief Complaint  Back Pain (Lower back into lower stomach)    Subjective          Radha Vasquez presents to [facility]  Started with low back pain late last week.  Radiates into lower abdomen.  BMs good.  No dysuira.  Has been doing a lot of heavy lifting at work.  Took a muscle relaxer that did not help.   Has had back trouble before.   No radiation down legs.         Objective   Vital Signs:   /64   Pulse 79   Temp 98.4 °F (36.9 °C)   Ht 162.6 cm (64\")   Wt 47.8 kg (105 lb 6.4 oz)   SpO2 99%   BMI 18.09 kg/m²     Physical Exam  Constitutional:       General: She is not in acute distress.     Appearance: Normal appearance. She is well-developed.   HENT:      Head: Normocephalic and atraumatic.      Right Ear: External ear normal.      Left Ear: External ear normal.   Eyes:      Conjunctiva/sclera: Conjunctivae normal.      Pupils: Pupils are equal, round, and reactive to light.   Neck:      Thyroid: No thyromegaly.   Pulmonary:      Effort: Pulmonary effort is normal.   Abdominal:      General: Abdomen is flat. Bowel sounds are normal.      Palpations: Abdomen is soft. There is no mass.      Tenderness: There is no abdominal tenderness. There is no right CVA tenderness or left CVA tenderness.   Musculoskeletal:         General: Tenderness present. No swelling, deformity or signs of injury. Normal range of motion.      Right lower leg: No edema.      Left lower leg: No edema.      Comments: Mild lower lumbar paraspinous muscle tenderness, no bony tenderness, full range of motion with pain.   Neurological:      Mental Status: She is alert and oriented to person, place, and time.   Psychiatric:         Mood and Affect: Mood normal.         Behavior: Behavior normal.        Result Review :   The following data was reviewed by: Meredith Lea Kehrer, MD on 08/24/2021:  UA    Urinalysis 12/17/20 8/24/21   Ketones, UA Negative Negative   Leukocytes, UA Negative Negative    "                  Assessment and Plan    Diagnoses and all orders for this visit:    1. Low back pain, unspecified back pain laterality, unspecified chronicity, unspecified whether sciatica present (Primary)  -     POC Urinalysis Dipstick, Automated  -     XR Spine Lumbar 2 or 3 View; Future  -     Ambulatory Referral to Physical Therapy Evaluate and treat  -     metaxalone (Skelaxin) 800 MG tablet; Take 0.5-1 tablets by mouth 3 (Three) Times a Day As Needed for Muscle Spasms.  Dispense: 30 tablet; Refill: 0  -     diclofenac (VOLTAREN) 75 MG EC tablet; Take 1 tablet by mouth 2 (Two) Times a Day. Take twice daily for 2 weeks then as needed  Dispense: 60 tablet; Refill: 0    Low back pain-likely musculoskeletal, will get x-rays and refer to PT, trial of different muscle relaxer and follow-up as needed    Follow Up   No follow-ups on file.  Patient was given instructions and counseling regarding her condition or for health maintenance advice. Please see specific information pulled into the AVS if appropriate.

## 2021-08-24 NOTE — PATIENT INSTRUCTIONS
Follow up with Nathanael pending the results of xray and physical therapy.    Acute Back Pain, Adult  Acute back pain is sudden and usually short-lived. It is often caused by an injury to the muscles and tissues in the back. The injury may result from:  · A muscle or ligament getting overstretched or torn (strained). Ligaments are tissues that connect bones to each other. Lifting something improperly can cause a back strain.  · Wear and tear (degeneration) of the spinal disks. Spinal disks are circular tissue that provide cushioning between the bones of the spine (vertebrae).  · Twisting motions, such as while playing sports or doing yard work.  · A hit to the back.  · Arthritis.  You may have a physical exam, lab tests, and imaging tests to find the cause of your pain. Acute back pain usually goes away with rest and home care.  Follow these instructions at home:  Managing pain, stiffness, and swelling  · Treatment may include medicines for pain and inflammation that are taken by mouth or applied to the skin, prescription pain medicine, or muscle relaxants. Take over-the-counter and prescription medicines only as told by your health care provider.  · Your health care provider may recommend applying ice during the first 24-48 hours after your pain starts. To do this:  ? Put ice in a plastic bag.  ? Place a towel between your skin and the bag.  ? Leave the ice on for 20 minutes, 2-3 times a day.  · If directed, apply heat to the affected area as often as told by your health care provider. Use the heat source that your health care provider recommends, such as a moist heat pack or a heating pad.  ? Place a towel between your skin and the heat source.  ? Leave the heat on for 20-30 minutes.  ? Remove the heat if your skin turns bright red. This is especially important if you are unable to feel pain, heat, or cold. You have a greater risk of getting burned.  Activity    · Do not stay in bed. Staying in bed for more than 1-2  days can delay your recovery.  · Sit up and stand up straight. Avoid leaning forward when you sit or hunching over when you stand.  ? If you work at a desk, sit close to it so you do not need to lean over. Keep your chin tucked in. Keep your neck drawn back, and keep your elbows bent at a 90-degree angle (right angle).  ? Sit high and close to the steering wheel when you drive. Add lower back (lumbar) support to your car seat, if needed.  · Take short walks on even surfaces as soon as you are able. Try to increase the length of time you walk each day.  · Do not sit, drive, or  one place for more than 30 minutes at a time. Sitting or standing for long periods of time can put stress on your back.  · Do not drive or use heavy machinery while taking prescription pain medicine.  · Use proper lifting techniques. When you bend and lift, use positions that put less stress on your back:  ? Bend your knees.  ? Keep the load close to your body.  ? Avoid twisting.  · Exercise regularly as told by your health care provider. Exercising helps your back heal faster and helps prevent back injuries by keeping muscles strong and flexible.  · Work with a physical therapist to make a safe exercise program, as recommended by your health care provider. Do any exercises as told by your physical therapist.  Lifestyle  · Maintain a healthy weight. Extra weight puts stress on your back and makes it difficult to have good posture.  · Avoid activities or situations that make you feel anxious or stressed. Stress and anxiety increase muscle tension and can make back pain worse. Learn ways to manage anxiety and stress, such as through exercise.  General instructions  · Sleep on a firm mattress in a comfortable position. Try lying on your side with your knees slightly bent. If you lie on your back, put a pillow under your knees.  · Follow your treatment plan as told by your health care provider. This may include:  ? Cognitive or behavioral  therapy.  ? Acupuncture or massage therapy.  ? Meditation or yoga.  Contact a health care provider if:  · You have pain that is not relieved with rest or medicine.  · You have increasing pain going down into your legs or buttocks.  · Your pain does not improve after 2 weeks.  · You have pain at night.  · You lose weight without trying.  · You have a fever or chills.  Get help right away if:  · You develop new bowel or bladder control problems.  · You have unusual weakness or numbness in your arms or legs.  · You develop nausea or vomiting.  · You develop abdominal pain.  · You feel faint.  Summary  · Acute back pain is sudden and usually short-lived.  · Use proper lifting techniques. When you bend and lift, use positions that put less stress on your back.  · Take over-the-counter and prescription medicines and apply heat or ice as directed by your health care provider.  This information is not intended to replace advice given to you by your health care provider. Make sure you discuss any questions you have with your health care provider.  Document Revised: 12/11/2020 Document Reviewed: 08/01/2018  Elsevier Patient Education © 2021 [employer]

## 2021-08-26 ENCOUNTER — TELEPHONE (OUTPATIENT)
Dept: FAMILY MEDICINE CLINIC | Facility: CLINIC | Age: 51
End: 2021-08-26

## 2021-08-26 NOTE — TELEPHONE ENCOUNTER
Caller: Radha Vasquez    Relationship: Self    Best call back number: [phone]*    Caller requesting test results: SELF    What test was performed: XRAY LUMBAR    When was the test performed: 08/24/21    Additional notes: PATIENT REQUESTING A CALLBACK WITH XRAY RESULTS.

## 2022-01-03 RX ORDER — PANTOPRAZOLE SODIUM 40 MG/1
TABLET, DELAYED RELEASE ORAL
Qty: 90 TABLET | Refills: 1 | Status: SHIPPED | OUTPATIENT
Start: 2022-01-03 | End: 2022-04-28 | Stop reason: SDUPTHER

## 2022-01-03 RX ORDER — ESCITALOPRAM OXALATE 5 MG/1
TABLET ORAL
Qty: 30 TABLET | Refills: 2 | Status: SHIPPED | OUTPATIENT
Start: 2022-01-03 | End: 2022-04-11

## 2022-01-17 ENCOUNTER — TELEPHONE (OUTPATIENT)
Dept: FAMILY MEDICINE CLINIC | Facility: CLINIC | Age: 52
End: 2022-01-17

## 2022-01-17 RX ORDER — BROMPHENIRAMINE MALEATE, PSEUDOEPHEDRINE HYDROCHLORIDE, AND DEXTROMETHORPHAN HYDROBROMIDE 2; 30; 10 MG/5ML; MG/5ML; MG/5ML
5 SYRUP ORAL 4 TIMES DAILY PRN
Qty: 240 ML | Refills: 0 | Status: SHIPPED | OUTPATIENT
Start: 2022-01-17 | End: 2022-04-28

## 2022-01-17 RX ORDER — CETIRIZINE HYDROCHLORIDE, PSEUDOEPHEDRINE HYDROCHLORIDE 5; 120 MG/1; MG/1
1 TABLET, FILM COATED, EXTENDED RELEASE ORAL 2 TIMES DAILY
Qty: 60 TABLET | Refills: 0 | Status: SHIPPED | OUTPATIENT
Start: 2022-01-17 | End: 2022-04-28 | Stop reason: SDUPTHER

## 2022-01-17 NOTE — TELEPHONE ENCOUNTER
Caller: Christina Radha    Relationship: Self    Best call back number: [phone]     What medication are you requesting: SOMETHING FOR CONGESTION    What are your current symptoms: CONGESTION / DRY THROAT     How long have you been experiencing symptoms: 01/12/22    Have you had these symptoms before:    [] Yes  [x] No    Have you been treated for these symptoms before:   [] Yes  [x] No    If a prescription is needed, what is your preferred pharmacy and phone number: [facility] [address] - [address] [place] [address] AT  [address] - [phone]  - [phone] FX     Additional notes:PATIENT CALLING STATING THAT SHE CANT COME BACK UNTIL THE CONGESTION IS CLEARED UP SHE WOULD LIKE TO GET SOMETHING PRESCRIBED

## 2022-01-17 NOTE — TELEPHONE ENCOUNTER
PT IS STATING THAT SHE TESTED POSITIVE LAST Wednesday. PT IS STATING THAT SHE CAN NOT COME BACK TO WORK UNTIL HER CONGESTION IS CLEARED UP.  INFORMED HER THAT COULD TAKE WEEKS. 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

## 2022-04-11 RX ORDER — ESCITALOPRAM OXALATE 5 MG/1
TABLET ORAL
Qty: 30 TABLET | Refills: 2 | Status: SHIPPED | OUTPATIENT
Start: 2022-04-11 | End: 2022-04-28

## 2022-04-28 ENCOUNTER — OFFICE VISIT (OUTPATIENT)
Dept: FAMILY MEDICINE CLINIC | Facility: CLINIC | Age: 52
End: 2022-04-28

## 2022-04-28 VITALS
SYSTOLIC BLOOD PRESSURE: 96 MMHG | WEIGHT: 102.6 LBS | TEMPERATURE: 98.2 F | HEART RATE: 74 BPM | DIASTOLIC BLOOD PRESSURE: 64 MMHG | HEIGHT: 64 IN | BODY MASS INDEX: 17.52 KG/M2 | OXYGEN SATURATION: 97 %

## 2022-04-28 DIAGNOSIS — J01.00 ACUTE MAXILLARY SINUSITIS, RECURRENCE NOT SPECIFIED: ICD-10-CM

## 2022-04-28 DIAGNOSIS — J30.9 ALLERGIC RHINITIS, UNSPECIFIED SEASONALITY, UNSPECIFIED TRIGGER: ICD-10-CM

## 2022-04-28 DIAGNOSIS — F41.8 ANXIETY WITH DEPRESSION: ICD-10-CM

## 2022-04-28 DIAGNOSIS — Z00.01 ENCOUNTER FOR GENERAL ADULT MEDICAL EXAMINATION WITH ABNORMAL FINDINGS: Primary | ICD-10-CM

## 2022-04-28 DIAGNOSIS — H65.02 ACUTE SEROUS OTITIS MEDIA OF LEFT EAR, RECURRENCE NOT SPECIFIED: ICD-10-CM

## 2022-04-28 DIAGNOSIS — Z12.11 SCREENING FOR COLON CANCER: ICD-10-CM

## 2022-04-28 DIAGNOSIS — K21.9 GASTROESOPHAGEAL REFLUX DISEASE WITHOUT ESOPHAGITIS: ICD-10-CM

## 2022-04-28 DIAGNOSIS — Z12.4 SCREENING FOR CERVICAL CANCER: ICD-10-CM

## 2022-04-28 DIAGNOSIS — Z12.31 ENCOUNTER FOR SCREENING MAMMOGRAM FOR MALIGNANT NEOPLASM OF BREAST: ICD-10-CM

## 2022-04-28 PROCEDURE — 99396 PREV VISIT EST AGE 40-64: CPT | Performed by: NURSE PRACTITIONER

## 2022-04-28 RX ORDER — ESCITALOPRAM OXALATE 10 MG/1
10 TABLET ORAL DAILY
Qty: 90 TABLET | Refills: 3 | Status: SHIPPED | OUTPATIENT
Start: 2022-04-28

## 2022-04-28 RX ORDER — PREDNISONE 20 MG/1
TABLET ORAL
Qty: 9 TABLET | Refills: 0 | Status: SHIPPED | OUTPATIENT
Start: 2022-04-28 | End: 2022-05-04

## 2022-04-28 RX ORDER — CETIRIZINE HYDROCHLORIDE, PSEUDOEPHEDRINE HYDROCHLORIDE 5; 120 MG/1; MG/1
1 TABLET, FILM COATED, EXTENDED RELEASE ORAL 2 TIMES DAILY
Qty: 60 TABLET | Refills: 5 | Status: SHIPPED | OUTPATIENT
Start: 2022-04-28 | End: 2022-12-07

## 2022-04-28 RX ORDER — FAMOTIDINE 20 MG/1
20 TABLET, FILM COATED ORAL 2 TIMES DAILY
Qty: 180 TABLET | Refills: 3 | Status: SHIPPED | OUTPATIENT
Start: 2022-04-28

## 2022-04-28 RX ORDER — PANTOPRAZOLE SODIUM 40 MG/1
40 TABLET, DELAYED RELEASE ORAL DAILY
Qty: 90 TABLET | Refills: 3 | Status: SHIPPED | OUTPATIENT
Start: 2022-04-28

## 2022-04-28 RX ORDER — AMOXICILLIN AND CLAVULANATE POTASSIUM 875; 125 MG/1; MG/1
1 TABLET, FILM COATED ORAL 2 TIMES DAILY
Qty: 14 TABLET | Refills: 0 | Status: SHIPPED | OUTPATIENT
Start: 2022-04-28 | End: 2022-05-27

## 2022-04-28 NOTE — PROGRESS NOTES
Subjective   Radha Vasquez is a 51 y.o. female who presents for annual female wellness exam.  Chief Complaint   Patient presents with   • Annual Exam     No pap   • Headache       Patient is here today for complete physical.  Has complaints of headaches.    She reports headaches, loss of voice, ear pain, sinus pressure, green discharge. Also reports she has a bad tooth.      Heartburn- not taking pepcid and can tell a big difference.  Is still taking protonix.      Antidepressant- Lexapro is working ok.  Feels like things are on her nerves.          Menstrual History: doesn't have menstrual cycle  Pregnancy History: 3 in past  Sexual History: not currently   Contraception: n/a  Hormone Replacement Therapy: n/a  Diet: doesn't watch what she eats, drinks water mostly but some soda and tea.   Exercise: active at work  Do you feel safe? Reports she does  Have you ever been abused? denies  Dentist: has appt in May  Eye doctor:  yearly    Mammogram: 12/2019  Pap Smear: not sure  Bone Density: n/a  Colon Cancer Screening: never    Immunization History   Administered Date(s) Administered   • COVID-19 (MODERNA) 1st, 2nd, 3rd Dose Only 02/07/2021, 03/05/2021   • Flu Vaccine Split Quad 10/17/2018   • Flublok 18+yrs 11/11/2019   • Fluzone Split Quad (Multi-dose) 11/11/2019       The following portions of the patient's history were reviewed and updated as appropriate: allergies, current medications, past family history, past medical history, past social history, past surgical history and problem list.    Past Medical History:   Diagnosis Date   • Acute sinusitis    • Cough    • Pap smear for cervical cancer screening    • Visit for screening mammogram    • Wheezing        Past Surgical History:   Procedure Laterality Date   • COLONOSCOPY     • TUBAL ABDOMINAL LIGATION         Family History   Problem Relation Age of Onset   • Hypertension Mother    • Osteoporosis Mother        Social History     Socioeconomic History   •  Marital status: Single   Tobacco Use   • Smoking status: Light Tobacco Smoker   • Smokeless tobacco: Never Used   Substance and Sexual Activity   • Alcohol use: Yes     Comment: rare       Review of Systems    Objective   Vitals:    04/28/22 1408   BP: 96/64   Pulse: 74   Temp: 98.2 °F (36.8 °C)   SpO2: 97%     Body mass index is 17.61 kg/m².  Physical Exam  Constitutional:       Appearance: Normal appearance.   HENT:      Head: Normocephalic and atraumatic.      Right Ear: Tympanic membrane has decreased mobility.      Left Ear: Tympanic membrane is injected and erythematous. Tympanic membrane has decreased mobility.      Nose: Rhinorrhea present.      Right Sinus: Maxillary sinus tenderness present. No frontal sinus tenderness.      Left Sinus: Maxillary sinus tenderness present. No frontal sinus tenderness.      Mouth/Throat:      Comments: Post nasal drainage noted  Cardiovascular:      Rate and Rhythm: Normal rate and regular rhythm.      Pulses: Normal pulses.   Pulmonary:      Effort: Pulmonary effort is normal.      Breath sounds: Normal breath sounds.   Skin:     General: Skin is warm and dry.   Neurological:      Mental Status: She is alert and oriented to person, place, and time.   Psychiatric:         Mood and Affect: Mood normal.         Behavior: Behavior normal.         Thought Content: Thought content normal.         Judgment: Judgment normal.           Assessment/Plan   Diagnoses and all orders for this visit:    1. Encounter for general adult medical examination with abnormal findings (Primary)  -     CBC & Differential  -     Comprehensive Metabolic Panel  -     Lipid Panel  -     TSH    2. Anxiety with depression  -     CBC & Differential  -     Comprehensive Metabolic Panel  -     Lipid Panel  -     TSH    3. Gastroesophageal reflux disease without esophagitis  -     CBC & Differential  -     Comprehensive Metabolic Panel  -     Lipid Panel  -     TSH    4. Acute maxillary sinusitis, recurrence  not specified    5. Allergic rhinitis, unspecified seasonality, unspecified trigger  -     CBC & Differential  -     Comprehensive Metabolic Panel  -     Lipid Panel  -     TSH    6. Screening for colon cancer  -     Ambulatory Referral For Screening Colonoscopy    7. Screening for cervical cancer  -     Ambulatory Referral to Obstetrics / Gynecology    8. Acute serous otitis media of left ear, recurrence not specified    9. Encounter for screening mammogram for malignant neoplasm of breast  -     Mammo Screening Bilateral With CAD; Future    Other orders  -     pantoprazole (PROTONIX) 40 MG EC tablet; Take 1 tablet by mouth Daily.  Dispense: 90 tablet; Refill: 3  -     famotidine (PEPCID) 20 MG tablet; Take 1 tablet by mouth 2 (Two) Times a Day.  Dispense: 180 tablet; Refill: 3  -     escitalopram (LEXAPRO) 10 MG tablet; Take 1 tablet by mouth Daily.  Dispense: 90 tablet; Refill: 3  -     cetirizine-pseudoephedrine (ZyrTEC-D) 5-120 MG per 12 hr tablet; Take 1 tablet by mouth 2 (Two) Times a Day.  Dispense: 60 tablet; Refill: 5  -     predniSONE (DELTASONE) 20 MG tablet; Take 1 tablet by mouth 2 (Two) Times a Day for 3 days, THEN 1 tablet Daily for 3 days.  Dispense: 9 tablet; Refill: 0  -     amoxicillin-clavulanate (AUGMENTIN) 875-125 MG per tablet; Take 1 tablet by mouth 2 (Two) Times a Day.  Dispense: 14 tablet; Refill: 0      Healthy adult physical.      Will treat for sinus and ear infection.    Will restart Zyrtec-D.  Prescription given.  If no resolution follow-up as needed    We will check labs today and call with results any changes needed plan of care  Referral made for screening colonoscopy referral made for OB/GYN as she would like to them to do her Pap smear  Referral made for mammogram.      Heartburn- not taking pepcid and can tell a big difference.  Is still taking protonix.  Refills given on Protonix and Pepcid.    Antidepressant- Lexapro is working ok.  Feels like things are on her nerves.  We  will increase dose of Lexapro.  If any issues notify provider.    Follow-up yearly for physical and as needed.           Discussed the importance of maintaining a healthy weight and getting regular exercise.  Educated patient on the benefits of healthy diet.  Advise follow-up annually for wellness exams.

## 2022-04-29 DIAGNOSIS — N28.9 DECREASED RENAL FUNCTION: Primary | ICD-10-CM

## 2022-04-29 DIAGNOSIS — R79.89 ABNORMAL THYROID STIMULATING HORMONE (TSH) LEVEL: ICD-10-CM

## 2022-04-29 LAB
ALBUMIN SERPL-MCNC: 4.2 G/DL (ref 3.8–4.8)
ALBUMIN/GLOB SERPL: 1.8 {RATIO} (ref 1.2–2.2)
ALP SERPL-CCNC: 94 IU/L (ref 44–121)
ALT SERPL-CCNC: 8 IU/L (ref 0–32)
AST SERPL-CCNC: 15 IU/L (ref 0–40)
BASOPHILS # BLD AUTO: 0.1 X10E3/UL (ref 0–0.2)
BASOPHILS NFR BLD AUTO: 1 %
BILIRUB SERPL-MCNC: <0.2 MG/DL (ref 0–1.2)
BUN SERPL-MCNC: 17 MG/DL (ref 6–24)
BUN/CREAT SERPL: 15 (ref 9–23)
CALCIUM SERPL-MCNC: 9.3 MG/DL (ref 8.7–10.2)
CHLORIDE SERPL-SCNC: 105 MMOL/L (ref 96–106)
CHOLEST SERPL-MCNC: 177 MG/DL (ref 100–199)
CO2 SERPL-SCNC: 21 MMOL/L (ref 20–29)
CREAT SERPL-MCNC: 1.14 MG/DL (ref 0.57–1)
EGFRCR SERPLBLD CKD-EPI 2021: 59 ML/MIN/1.73
EOSINOPHIL # BLD AUTO: 0.4 X10E3/UL (ref 0–0.4)
EOSINOPHIL NFR BLD AUTO: 6 %
ERYTHROCYTE [DISTWIDTH] IN BLOOD BY AUTOMATED COUNT: 12.4 % (ref 11.7–15.4)
GLOBULIN SER CALC-MCNC: 2.3 G/DL (ref 1.5–4.5)
GLUCOSE SERPL-MCNC: 84 MG/DL (ref 65–99)
HCT VFR BLD AUTO: 39.1 % (ref 34–46.6)
HDLC SERPL-MCNC: 46 MG/DL
HGB BLD-MCNC: 12.7 G/DL (ref 11.1–15.9)
IMM GRANULOCYTES # BLD AUTO: 0 X10E3/UL (ref 0–0.1)
IMM GRANULOCYTES NFR BLD AUTO: 0 %
LDLC SERPL CALC-MCNC: 111 MG/DL (ref 0–99)
LYMPHOCYTES # BLD AUTO: 2.9 X10E3/UL (ref 0.7–3.1)
LYMPHOCYTES NFR BLD AUTO: 41 %
MCH RBC QN AUTO: 29.9 PG (ref 26.6–33)
MCHC RBC AUTO-ENTMCNC: 32.5 G/DL (ref 31.5–35.7)
MCV RBC AUTO: 92 FL (ref 79–97)
MONOCYTES # BLD AUTO: 0.5 X10E3/UL (ref 0.1–0.9)
MONOCYTES NFR BLD AUTO: 7 %
NEUTROPHILS # BLD AUTO: 3.1 X10E3/UL (ref 1.4–7)
NEUTROPHILS NFR BLD AUTO: 45 %
PLATELET # BLD AUTO: 350 X10E3/UL (ref 150–450)
POTASSIUM SERPL-SCNC: 4.4 MMOL/L (ref 3.5–5.2)
PROT SERPL-MCNC: 6.5 G/DL (ref 6–8.5)
RBC # BLD AUTO: 4.25 X10E6/UL (ref 3.77–5.28)
SODIUM SERPL-SCNC: 141 MMOL/L (ref 134–144)
TRIGL SERPL-MCNC: 113 MG/DL (ref 0–149)
TSH SERPL DL<=0.005 MIU/L-ACNC: 0.45 UIU/ML (ref 0.45–4.5)
VLDLC SERPL CALC-MCNC: 20 MG/DL (ref 5–40)
WBC # BLD AUTO: 6.9 X10E3/UL (ref 3.4–10.8)

## 2022-04-29 NOTE — PROGRESS NOTES
Please call the patient regarding her lab results.  Please let her know that her renal function is very slightly lower than normal.  I would like her to increase water intake as much as possible and avoid ibuprofen and naproxen over-the-counter.  Also her thyroid stimulating hormone is very slightly out of range.  Normalized 0.45 and she was 0.448.  I would like her to follow-up in 6 weeks for lab only BMP and TSH

## 2022-05-27 ENCOUNTER — OFFICE VISIT (OUTPATIENT)
Dept: FAMILY MEDICINE CLINIC | Facility: CLINIC | Age: 52
End: 2022-05-27

## 2022-05-27 VITALS
OXYGEN SATURATION: 96 % | HEART RATE: 96 BPM | WEIGHT: 104.6 LBS | HEIGHT: 64 IN | DIASTOLIC BLOOD PRESSURE: 80 MMHG | TEMPERATURE: 98.2 F | BODY MASS INDEX: 17.86 KG/M2 | SYSTOLIC BLOOD PRESSURE: 122 MMHG

## 2022-05-27 DIAGNOSIS — R07.81 RIB PAIN ON RIGHT SIDE: Primary | ICD-10-CM

## 2022-05-27 PROCEDURE — 99213 OFFICE O/P EST LOW 20 MIN: CPT | Performed by: NURSE PRACTITIONER

## 2022-05-27 RX ORDER — CYCLOBENZAPRINE HCL 10 MG
10 TABLET ORAL 3 TIMES DAILY PRN
Qty: 30 TABLET | Refills: 5 | Status: SHIPPED | OUTPATIENT
Start: 2022-05-27

## 2022-05-27 RX ORDER — HYDROCODONE BITARTRATE AND ACETAMINOPHEN 5; 325 MG/1; MG/1
1 TABLET ORAL EVERY 6 HOURS PRN
Qty: 16 TABLET | Refills: 0 | Status: SHIPPED | OUTPATIENT
Start: 2022-05-27 | End: 2022-08-26

## 2022-05-27 NOTE — PROGRESS NOTES
"Chief Complaint  Chest Pain and Rib Injury (Rib pain, form coughing form allergies, has been going on for 3 or 4 days/)    Subjective          Radha Vasquez presents to [facility]  History of Present Illness     Patient is here today with complaint of rib pain in right rib that starting 3-4 days ago.    Denies any fall or injury.    Hurts worse cough, sneeze, laughing.    Feels like in ribs.       Push mowed yard before it started.          Objective   Vital Signs:  /80   Pulse 96   Temp 98.2 °F (36.8 °C)   Ht 162.6 cm (64\")   Wt 47.4 kg (104 lb 9.6 oz)   SpO2 96%   BMI 17.95 kg/m²         Physical Exam  Constitutional:       Appearance: Normal appearance.   Cardiovascular:      Rate and Rhythm: Normal rate and regular rhythm.      Pulses: Normal pulses.   Pulmonary:      Effort: Pulmonary effort is normal.      Breath sounds: Normal breath sounds.   Chest:      Chest wall: Tenderness (To palpation right fourth and fifth rib) present.   Skin:     General: Skin is warm and dry.   Neurological:      Mental Status: She is alert.   Psychiatric:         Mood and Affect: Mood normal.         Behavior: Behavior normal.         Thought Content: Thought content normal.         Judgment: Judgment normal.        Result Review :{Labs  Result Review  Imaging  Med Tab  Media  Procedures :23}                 Assessment and Plan    Diagnoses and all orders for this visit:    1. Rib pain on right side (Primary)  -     XR Ribs Right With PA Chest; Future  -     HYDROcodone-acetaminophen (NORCO) 5-325 MG per tablet; Take 1 tablet by mouth Every 6 (Six) Hours As Needed for Severe Pain .  Dispense: 16 tablet; Refill: 0    Other orders  -     cyclobenzaprine (FLEXERIL) 10 MG tablet; Take 1 tablet by mouth 3 (Three) Times a Day As Needed for Muscle Spasms.  Dispense: 30 tablet; Refill: 5      Will obtain x-ray for further evaluation.  Continue diclofenac as needed.  Muscle laxer " given.  Also giving a few pain medication as pain is severe.  Advised her not to take with muscle relaxer.  She verbalized understanding.    Will call with results of x-ray and any changes needed plan of care.           Follow Up   No follow-ups on file.  Patient was given instructions and counseling regarding her condition or for health maintenance advice. Please see specific information pulled into the AVS if appropriate.

## 2022-05-31 ENCOUNTER — TELEPHONE (OUTPATIENT)
Dept: FAMILY MEDICINE CLINIC | Facility: CLINIC | Age: 52
End: 2022-05-31

## 2022-05-31 NOTE — TELEPHONE ENCOUNTER
Caller: Radha Vasquez    Relationship: Self    Best call back number: [phone]    Caller requesting test results: PATIENT    What test was performed: XRAY OF RIGHT SIDE RIBS     When was the test performed: 5/27/22    Where was the test performed: [facility]    Additional notes:

## 2022-06-03 DIAGNOSIS — R07.81 RIB PAIN ON RIGHT SIDE: ICD-10-CM

## 2022-08-26 ENCOUNTER — OFFICE VISIT (OUTPATIENT)
Dept: FAMILY MEDICINE CLINIC | Facility: CLINIC | Age: 52
End: 2022-08-26

## 2022-08-26 VITALS
TEMPERATURE: 97.3 F | HEIGHT: 64 IN | SYSTOLIC BLOOD PRESSURE: 98 MMHG | HEART RATE: 82 BPM | OXYGEN SATURATION: 99 % | BODY MASS INDEX: 17.55 KG/M2 | DIASTOLIC BLOOD PRESSURE: 62 MMHG | WEIGHT: 102.8 LBS

## 2022-08-26 DIAGNOSIS — R09.81 NASAL CONGESTION: ICD-10-CM

## 2022-08-26 DIAGNOSIS — J06.9 UPPER RESPIRATORY TRACT INFECTION, UNSPECIFIED TYPE: Primary | ICD-10-CM

## 2022-08-26 LAB
EXPIRATION DATE: NORMAL
INTERNAL CONTROL: NORMAL
Lab: NORMAL
SARS-COV-2 AG UPPER RESP QL IA.RAPID: NOT DETECTED

## 2022-08-26 PROCEDURE — 87426 SARSCOV CORONAVIRUS AG IA: CPT | Performed by: FAMILY MEDICINE

## 2022-08-26 PROCEDURE — 99213 OFFICE O/P EST LOW 20 MIN: CPT | Performed by: FAMILY MEDICINE

## 2022-08-26 RX ORDER — DEXTROMETHORPHAN HYDROBROMIDE AND PROMETHAZINE HYDROCHLORIDE 15; 6.25 MG/5ML; MG/5ML
5 SYRUP ORAL NIGHTLY PRN
Qty: 118 ML | Refills: 0 | Status: SHIPPED | OUTPATIENT
Start: 2022-08-26 | End: 2023-02-02 | Stop reason: SDUPTHER

## 2022-08-26 NOTE — PROGRESS NOTES
"Chief Complaint  Fatigue (For over a week ), Headache, Fever, Cough, and Nasal Congestion    Subjective        Radha Vasquez presents to [facility]  History of Present Illness     Patient of GERMÁN Amador is here today with new complaints of fatigue, headache, fever, cough, nasal congestion, and bilateral ear fullness with pain and difficulty hearing associated for about 1 week. 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 In addition to her places of employment, she has been in close contact with an unmasked friend who had recently gotten over COVID-19. At this time, she does not feel as though her symptoms are worsening but she just is not improving after 1 week of symptoms.     Regarding the daily headaches she is experiencing, she reports she cannot use ibuprofen due to a spot on her liver but Apolinar aspirin does seem to help.    Of note, Ms. Vasquez does complain of environmental allergies and a history of using inhalers many years ago. She does also have Flonase though she does not use it routinely.     She denies difficulty breathing and reports a cough that is intermittently productive.       Objective   Vital Signs:  BP 98/62   Pulse 82   Temp 97.3 °F (36.3 °C)   Ht 162.6 cm (64\")   Wt 46.6 kg (102 lb 12.8 oz)   SpO2 99%   BMI 17.65 kg/m²   Estimated body mass index is 17.65 kg/m² as calculated from the following:    Height as of this encounter: 162.6 cm (64\").    Weight as of this encounter: 46.6 kg (102 lb 12.8 oz).          Physical Exam  Vitals and nursing note reviewed.   Constitutional:       Appearance: Normal appearance. She is well-developed and normal weight.   HENT:      Head: Normocephalic and atraumatic.      Right Ear: Tympanic membrane, ear canal and external ear normal.      Left Ear: Tympanic membrane and external ear normal.      " Ears:      Comments: Fluid in the bilateral middle ears noted on exam. Cerumen was extracted from the L ear today.      Nose: Nose normal.   Eyes:      General: No scleral icterus.     Conjunctiva/sclera: Conjunctivae normal.   Cardiovascular:      Rate and Rhythm: Normal rate and regular rhythm.      Heart sounds: Normal heart sounds.   Pulmonary:      Effort: Pulmonary effort is normal.      Breath sounds: Normal breath sounds.   Musculoskeletal:      Cervical back: Normal range of motion and neck supple.      Right lower leg: No edema.      Left lower leg: No edema.   Lymphadenopathy:      Cervical: No cervical adenopathy.   Skin:     General: Skin is warm and dry.      Findings: No rash.   Neurological:      Mental Status: She is alert and oriented to person, place, and time.   Psychiatric:         Mood and Affect: Mood normal.         Behavior: Behavior normal.         Thought Content: Thought content normal.         Judgment: Judgment normal.        Result Review :  The following data was reviewed by: Kelin Ortiz DO on 08/26/2022:  Common labs    Common Labsle 4/28/22 4/28/22 4/28/22 6/20/22    1445 1445 1445    Glucose  84  89   BUN  17  8   Creatinine  1.14 (A)  0.88   Sodium  141  143   Potassium  4.4  4.6   Chloride  105  102   Calcium  9.3  9.7   Total Protein  6.5     Albumin  4.2     Total Bilirubin  <0.2     Alkaline Phosphatase  94     AST (SGOT)  15     ALT (SGPT)  8     WBC 6.9      Hemoglobin 12.7      Hematocrit 39.1      Platelets 350      Total Cholesterol   177    Triglycerides   113    HDL Cholesterol   46    LDL Cholesterol    111 (A)    (A) Abnormal value            TSH    TSH 4/28/22 6/20/22   TSH 0.448 (A) 0.458   (A) Abnormal value            In office COVID-19 POCT rapid test: Negative.              Assessment and Plan   Diagnoses and all orders for this visit:    1. Upper respiratory tract infection, unspecified type (Primary)  -     promethazine-dextromethorphan (PROMETHAZINE-DM)  6.25-15 MG/5ML syrup; Take 5 mL by mouth At Night As Needed for Cough.  Dispense: 118 mL; Refill: 0  -     Begin promethazine as prescribed.    -     Begin Delsym 12-hour cough suppressant once per day, in the morning.    2. Nasal congestion  -     POCT SARS-CoV-2 Antigen ZAHIRA  -     Recommend Mucinex 600 mg, 2 tablets every morning. Avoid night time use to prevent excessive nocturnal coughing affecting the patient's rest.     I do feel there is a viral component to her current symptoms even though the rapid COVID in office was negative. I have recommended supportive therapy of Mucinex 600 mg every morning along with Delsym 12-hour cough suppressant for day time use and promethazine DM to be used nightly to assist with sleep and rest while recovering. She should continue using Flonase on a more routine basis while her symptoms are still present. For her bilateral ear pain and pressure, I have recommended warm moist heat while showering to help clear out excessive cerumen and relieve some of her pain. She may use Tylenol or Apolinar aspirin in moderation as needed for headaches. Note given to be off work the next few days.           Follow Up   Return if symptoms worsen or fail to improve.   Patient was given instructions and counseling regarding her condition or for health maintenance advice. Please see specific information pulled into the AVS if appropriate.     Transcribed from ambient dictation for Kelin Ortiz DO by VIKTORIA RAMIREZ.  08/26/22   17:30 EDT    Patient verbalized consent to the visit recording.

## 2022-12-07 RX ORDER — CETIRIZINE HYDROCHLORIDE, PSEUDOEPHEDRINE HYDROCHLORIDE 5; 120 MG/1; MG/1
TABLET, FILM COATED, EXTENDED RELEASE ORAL
Qty: 56 TABLET | Refills: 5 | Status: SHIPPED | OUTPATIENT
Start: 2022-12-07

## 2023-02-02 ENCOUNTER — OFFICE VISIT (OUTPATIENT)
Dept: FAMILY MEDICINE CLINIC | Facility: CLINIC | Age: 53
End: 2023-02-02
Payer: COMMERCIAL

## 2023-02-02 VITALS
HEIGHT: 64 IN | BODY MASS INDEX: 17.96 KG/M2 | DIASTOLIC BLOOD PRESSURE: 62 MMHG | OXYGEN SATURATION: 98 % | HEART RATE: 101 BPM | WEIGHT: 105.2 LBS | TEMPERATURE: 97.5 F | SYSTOLIC BLOOD PRESSURE: 104 MMHG

## 2023-02-02 DIAGNOSIS — J10.1 INFLUENZA A: ICD-10-CM

## 2023-02-02 DIAGNOSIS — J06.9 UPPER RESPIRATORY TRACT INFECTION, UNSPECIFIED TYPE: ICD-10-CM

## 2023-02-02 DIAGNOSIS — R05.9 COUGH, UNSPECIFIED TYPE: Primary | ICD-10-CM

## 2023-02-02 LAB
EXPIRATION DATE: ABNORMAL
EXPIRATION DATE: NORMAL
FLUAV AG NPH QL: POSITIVE
FLUBV AG NPH QL: NEGATIVE
INTERNAL CONTROL: ABNORMAL
INTERNAL CONTROL: NORMAL
Lab: ABNORMAL
Lab: NORMAL
SARS-COV-2 AG UPPER RESP QL IA.RAPID: NOT DETECTED

## 2023-02-02 PROCEDURE — 87426 SARSCOV CORONAVIRUS AG IA: CPT | Performed by: NURSE PRACTITIONER

## 2023-02-02 PROCEDURE — 99214 OFFICE O/P EST MOD 30 MIN: CPT | Performed by: NURSE PRACTITIONER

## 2023-02-02 PROCEDURE — 87804 INFLUENZA ASSAY W/OPTIC: CPT | Performed by: NURSE PRACTITIONER

## 2023-02-02 RX ORDER — DEXTROMETHORPHAN HYDROBROMIDE AND PROMETHAZINE HYDROCHLORIDE 15; 6.25 MG/5ML; MG/5ML
5 SYRUP ORAL NIGHTLY PRN
Qty: 180 ML | Refills: 0 | Status: SHIPPED | OUTPATIENT
Start: 2023-02-02

## 2023-02-02 NOTE — PROGRESS NOTES
"Chief Complaint  Cough, Fatigue, and Headache    Subjective        Radha Vasquez presents to [facility]  History of Present Illness     Patient is here today with complaint of cough, fatigue, and headache since Monday.    Denies fever, shortness of breath    Objective   Vital Signs:  /62   Pulse 101   Temp 97.5 °F (36.4 °C)   Ht 162.6 cm (64\")   Wt 47.7 kg (105 lb 3.2 oz)   SpO2 98%   BMI 18.06 kg/m²   Estimated body mass index is 18.06 kg/m² as calculated from the following:    Height as of this encounter: 162.6 cm (64\").    Weight as of this encounter: 47.7 kg (105 lb 3.2 oz).             Physical Exam  Constitutional:       Appearance: Normal appearance. She is ill-appearing.   HENT:      Head: Normocephalic and atraumatic.      Right Ear: Tympanic membrane has decreased mobility.      Left Ear: Tympanic membrane has decreased mobility.      Nose: Rhinorrhea present.      Right Sinus: No maxillary sinus tenderness or frontal sinus tenderness.      Left Sinus: No maxillary sinus tenderness or frontal sinus tenderness.      Mouth/Throat:      Comments: Postnasal drainage  Cardiovascular:      Rate and Rhythm: Normal rate and regular rhythm.      Pulses: Normal pulses.   Pulmonary:      Effort: Pulmonary effort is normal.      Breath sounds: Normal breath sounds.   Skin:     General: Skin is warm and dry.   Neurological:      Mental Status: She is alert.   Psychiatric:         Mood and Affect: Mood normal.         Behavior: Behavior normal.         Thought Content: Thought content normal.         Judgment: Judgment normal.        Result Review :        COVID is negative  Influenza A is negative  Influenza B is negative           Assessment and Plan   Diagnoses and all orders for this visit:    1. Cough, unspecified type (Primary)  -     POC Influenza A / B  -     POCT [place] SARS-CoV-2 Antigen ZAHIRA    2. Influenza A    3. Upper respiratory tract infection, unspecified " type  -     promethazine-dextromethorphan (PROMETHAZINE-DM) 6.25-15 MG/5ML syrup; Take 5 mL by mouth At Night As Needed for Cough.  Dispense: 180 mL; Refill: 0    Will treat for flu.  Start cough medicine as needed.  Alternate Tylenol and ibuprofen as needed.  Increase water intake.  Rest is much as possible.  May return to work on Monday as long as she is feeling better.  Follow-up as needed.  She verbalized understanding and is agreeable.  If worsening shortness of breath or fever not controlled by medication go to the ER.         Follow Up   Return if symptoms worsen or fail to improve, for Next scheduled follow up.  Patient was given instructions and counseling regarding her condition or for health maintenance advice. Please see specific information pulled into the AVS if appropriate.

## 2023-04-19 RX ORDER — ESCITALOPRAM OXALATE 10 MG/1
TABLET ORAL
Qty: 90 TABLET | Refills: 0 | Status: SHIPPED | OUTPATIENT
Start: 2023-04-19

## 2023-04-20 ENCOUNTER — TELEMEDICINE (OUTPATIENT)
Dept: FAMILY MEDICINE CLINIC | Facility: CLINIC | Age: 53
End: 2023-04-20
Payer: COMMERCIAL

## 2023-04-20 DIAGNOSIS — J01.00 ACUTE MAXILLARY SINUSITIS, RECURRENCE NOT SPECIFIED: Primary | ICD-10-CM

## 2023-04-20 DIAGNOSIS — F17.210 CIGARETTE NICOTINE DEPENDENCE WITHOUT COMPLICATION: ICD-10-CM

## 2023-04-20 PROCEDURE — 99406 BEHAV CHNG SMOKING 3-10 MIN: CPT | Performed by: NURSE PRACTITIONER

## 2023-04-20 PROCEDURE — 99213 OFFICE O/P EST LOW 20 MIN: CPT | Performed by: NURSE PRACTITIONER

## 2023-04-20 RX ORDER — METHYLPREDNISOLONE 4 MG/1
TABLET ORAL
Qty: 21 TABLET | Refills: 0 | Status: SHIPPED | OUTPATIENT
Start: 2023-04-20

## 2023-04-20 RX ORDER — LEVOCETIRIZINE DIHYDROCHLORIDE 5 MG/1
5 TABLET, FILM COATED ORAL EVERY EVENING
Qty: 90 TABLET | Refills: 1 | Status: SHIPPED | OUTPATIENT
Start: 2023-04-20

## 2023-04-20 RX ORDER — VARENICLINE TARTRATE 0.5 MG/1
0.5 TABLET, FILM COATED ORAL 2 TIMES DAILY
Qty: 60 TABLET | Refills: 0 | Status: SHIPPED | OUTPATIENT
Start: 2023-04-20

## 2023-04-20 RX ORDER — VARENICLINE TARTRATE 1 MG/1
1 TABLET, FILM COATED ORAL 2 TIMES DAILY
Qty: 60 TABLET | Refills: 4 | Status: SHIPPED | OUTPATIENT
Start: 2023-04-20

## 2023-04-20 RX ORDER — AMOXICILLIN AND CLAVULANATE POTASSIUM 875; 125 MG/1; MG/1
1 TABLET, FILM COATED ORAL 2 TIMES DAILY
Qty: 20 TABLET | Refills: 0 | Status: SHIPPED | OUTPATIENT
Start: 2023-04-20

## 2023-04-20 NOTE — PROGRESS NOTES
"Chief Complaint  Cough, Hoarse, Earache, and Sore Throat    Subjective         Radha Vasquez presents to [facility]  History of Present Illness     Patient is here today via telehealth for cough, hoarse, ear ache, sore throat for last week.  Started losing voice today.     Feels like sinus infection.      Still has cough medication  Objective   Vital Signs:   There were no vitals taken for this visit.    Estimated body mass index is 18.06 kg/m² as calculated from the following:    Height as of 2/2/23: 162.6 cm (64\").    Weight as of 2/2/23: 47.7 kg (105 lb 3.2 oz).     Physical Exam   Constitutional: She appears well-developed and well-nourished.   Loss of voice noted   Neurological: She is alert.   Skin: Skin is warm and dry.   Psychiatric: She has a normal mood and affect.     Result Review :                 Assessment and Plan    Diagnoses and all orders for this visit:    1. Acute maxillary sinusitis, recurrence not specified (Primary)    2. Cigarette nicotine dependence without complication    Other orders  -     amoxicillin-clavulanate (AUGMENTIN) 875-125 MG per tablet; Take 1 tablet by mouth 2 (Two) Times a Day.  Dispense: 20 tablet; Refill: 0  -     methylPREDNISolone (MEDROL) 4 MG dose pack; Take as directed on package instructions.  Dispense: 21 tablet; Refill: 0  -     levocetirizine (XYZAL) 5 MG tablet; Take 1 tablet by mouth Every Evening.  Dispense: 90 tablet; Refill: 1  -     varenicline (Chantix) 0.5 MG tablet; Take 1 tablet by mouth 2 (Two) Times a Day.  Dispense: 60 tablet; Refill: 0  -     varenicline (Chantix Continuing Month Cholo) 1 MG tablet; Take 1 tablet by mouth 2 (Two) Times a Day.  Dispense: 60 tablet; Refill: 4      Will treat for sinus infection.  Trial Xyzal in place of Zyrtec.  If no resolution follow-up as needed    It is very important that she quit smoking. There are various alternatives available to help with this difficult task, but first and " foremost, she must make a firm commitment and decision to quit. The nature of nicotine addiction is discussed. The usefulness of behavioral therapy is discussed and suggested.  The quit rates are discussed. I recommend she not allow potential costs of treatment to deter her from using nicotine replacement therapy or bupropion, as the long term economic and health benefits are obvious.    Radha Vasquez  reports that she has been smoking cigarettes. She has a 15.00 pack-year smoking history. She has never used smokeless tobacco.. I have educated her on the risk of diseases from using tobacco products such as cancer, COPD and heart disease.     I advised her to quit and she is willing to quit. We have discussed the following method/s for tobacco cessation:  Prescription Medicaiton.  Together we have set a quit date for 1 month from today.  She will follow up with me in a few months or sooner to check on her progress.    I spent 3.5 minutes counseling the patient.      Time spent on visit was 10 minutes             Follow Up   Return if symptoms worsen or fail to improve, for Annual physical.  Patient was given instructions and counseling regarding her condition or for health maintenance advice. Please see specific information pulled into the AVS if appropriate.     Mode of Visit: Video  Location of patient: other: car  Location of provider: [facility]  You have chosen to receive care through a telehealth visit.  The patient has signed the video visit consent form.  The visit included audio and video interaction. No technical issues occurred during this visit.

## 2023-05-01 ENCOUNTER — OFFICE VISIT (OUTPATIENT)
Dept: FAMILY MEDICINE CLINIC | Facility: CLINIC | Age: 53
End: 2023-05-01
Payer: COMMERCIAL

## 2023-05-01 VITALS
HEIGHT: 64 IN | TEMPERATURE: 98.6 F | BODY MASS INDEX: 18.47 KG/M2 | OXYGEN SATURATION: 99 % | WEIGHT: 108.2 LBS | DIASTOLIC BLOOD PRESSURE: 62 MMHG | SYSTOLIC BLOOD PRESSURE: 116 MMHG | HEART RATE: 107 BPM

## 2023-05-01 DIAGNOSIS — N39.0 URINARY TRACT INFECTION WITH HEMATURIA, SITE UNSPECIFIED: ICD-10-CM

## 2023-05-01 DIAGNOSIS — R31.9 URINARY TRACT INFECTION WITH HEMATURIA, SITE UNSPECIFIED: ICD-10-CM

## 2023-05-01 DIAGNOSIS — R30.0 DYSURIA: Primary | ICD-10-CM

## 2023-05-01 LAB
BILIRUB BLD-MCNC: NEGATIVE MG/DL
EXPIRATION DATE: ABNORMAL
GLUCOSE UR STRIP-MCNC: NEGATIVE MG/DL
KETONES UR QL: NEGATIVE
LEUKOCYTE EST, POC: ABNORMAL
Lab: ABNORMAL
NITRITE UR-MCNC: NEGATIVE MG/ML
PH UR: 7 [PH] (ref 5–8)
PROT UR STRIP-MCNC: ABNORMAL MG/DL
RBC # UR STRIP: ABNORMAL /UL
SP GR UR: 1.01 (ref 1–1.03)
UROBILINOGEN UR QL: NORMAL

## 2023-05-01 RX ORDER — CIPROFLOXACIN 500 MG/1
500 TABLET, FILM COATED ORAL 2 TIMES DAILY
Qty: 14 TABLET | Refills: 0 | Status: SHIPPED | OUTPATIENT
Start: 2023-05-01

## 2023-05-01 RX ORDER — PHENAZOPYRIDINE HYDROCHLORIDE 200 MG/1
200 TABLET, FILM COATED ORAL 3 TIMES DAILY PRN
Qty: 6 TABLET | Refills: 0 | Status: SHIPPED | OUTPATIENT
Start: 2023-05-01

## 2023-05-01 RX ORDER — IPRATROPIUM BROMIDE 21 UG/1
2 SPRAY, METERED NASAL 3 TIMES DAILY
Qty: 30 ML | Refills: 5 | Status: SHIPPED | OUTPATIENT
Start: 2023-05-01

## 2023-05-01 NOTE — PROGRESS NOTES
"Chief Complaint  Difficulty Urinating    Subjective        Radha Vasquez presents to [facility]  History of Present Illness     Patient is here today with complaint of pain with urination since over the weekend.  Saturday was really bad    Objective   Vital Signs:  /62   Pulse 107   Temp 98.6 °F (37 °C)   Ht 162.6 cm (64\")   Wt 49.1 kg (108 lb 3.2 oz)   SpO2 99%   BMI 18.57 kg/m²   Estimated body mass index is 18.57 kg/m² as calculated from the following:    Height as of this encounter: 162.6 cm (64\").    Weight as of this encounter: 49.1 kg (108 lb 3.2 oz).       BMI is within normal parameters. No other follow-up for BMI required.      Physical Exam  Constitutional:       Appearance: Normal appearance.   Cardiovascular:      Rate and Rhythm: Normal rate and regular rhythm.      Pulses: Normal pulses.   Pulmonary:      Effort: Pulmonary effort is normal.      Breath sounds: Normal breath sounds.   Skin:     General: Skin is warm and dry.   Neurological:      Mental Status: She is alert.   Psychiatric:         Mood and Affect: Mood normal.         Behavior: Behavior normal.         Thought Content: Thought content normal.         Judgment: Judgment normal.        Result Review :                   Assessment and Plan   Diagnoses and all orders for this visit:    1. Dysuria (Primary)  -     POC Urinalysis Dipstick, Automated  -     Urine Culture - Urine, Urine, Clean Catch    2. Urinary tract infection with hematuria, site unspecified    Other orders  -     ciprofloxacin (Cipro) 500 MG tablet; Take 1 tablet by mouth 2 (Two) Times a Day.  Dispense: 14 tablet; Refill: 0  -     phenazopyridine (Pyridium) 200 MG tablet; Take 1 tablet by mouth 3 (Three) Times a Day As Needed for Bladder Spasms.  Dispense: 6 tablet; Refill: 0  -     ipratropium (ATROVENT) 0.03 % nasal spray; 2 sprays into the nostril(s) as directed by provider 3 (Three) Times a Day.  Dispense: 30 mL; " Refill: 5    Will treat for UTI.  We will culture urine.  Use Pyridium as needed.  If any changes needed plan of care will notify patient.  Increase water intake.  She verbalized understanding and is agreeable         Follow Up   No follow-ups on file.  Patient was given instructions and counseling regarding her condition or for health maintenance advice. Please see specific information pulled into the AVS if appropriate.

## 2023-05-04 LAB
BACTERIA UR CULT: ABNORMAL
BACTERIA UR CULT: ABNORMAL
ONE SPECIMEN IDENTIFIER: NORMAL
OTHER ANTIBIOTIC SUSC ISLT: ABNORMAL

## 2023-05-04 NOTE — PROGRESS NOTES
Please let patient know that bacteria did grow in urine.  Antibiotic will treat.  Be sure to finish all of it/

## 2023-05-22 ENCOUNTER — TELEPHONE (OUTPATIENT)
Dept: FAMILY MEDICINE CLINIC | Facility: CLINIC | Age: 53
End: 2023-05-22

## 2023-05-22 NOTE — TELEPHONE ENCOUNTER
Caller: Radha Vasquez    Relationship: Self    Best call back number: [phone]    What orders are you requesting (i.e. lab or imaging): LABS    In what timeframe would the patient need to come in: AS SOON AS POSSIBLE    Where will you receive your lab/imaging services: [facility]    Additional notes: PATIENT STATES THAT SHE WOULD LIKE DEMETRICE LLANES TO PUT IN ORDERS FOR HER ANNUAL LAB TESTS.    PLEASE ADVISE.

## 2023-06-15 RX ORDER — FAMOTIDINE 20 MG/1
TABLET, FILM COATED ORAL
Qty: 180 TABLET | Refills: 0 | Status: SHIPPED | OUTPATIENT
Start: 2023-06-15

## 2023-09-13 RX ORDER — FAMOTIDINE 20 MG/1
20 TABLET, FILM COATED ORAL 2 TIMES DAILY
Qty: 180 TABLET | Refills: 3 | Status: SHIPPED | OUTPATIENT
Start: 2023-09-13

## 2023-12-06 RX ORDER — LEVOCETIRIZINE DIHYDROCHLORIDE 5 MG/1
5 TABLET, FILM COATED ORAL EVERY EVENING
Qty: 90 TABLET | Refills: 1 | Status: SHIPPED | OUTPATIENT
Start: 2023-12-06

## 2024-01-11 ENCOUNTER — TELEPHONE (OUTPATIENT)
Dept: FAMILY MEDICINE CLINIC | Facility: CLINIC | Age: 54
End: 2024-01-11
Payer: COMMERCIAL

## 2024-01-11 NOTE — TELEPHONE ENCOUNTER
I need to assess knee before x-ray.  Please make her an appointment today or tomorrow which ever suits her.

## 2024-01-11 NOTE — TELEPHONE ENCOUNTER
----- Message from Ruth Ann Arredondo MA sent at 1/11/2024  1:53 PM EST -----  Regarding: FW: Having problems with my right knee   Contact: [phone]  DO YOU NEED TO SEE THE PATIENT BEFORE XRAY  ----- Message -----  From: Radha Vasquez  Sent: 1/11/2024   1:52 PM EST  To: [facility]  Subject: Having problems with my right knee               Hey lady, could you send me tomorrow to get an X-ray on my right knee? I hit it pretty hard, now I’m having trouble putting pressure on it!     Thanks a bunch  Radha

## 2024-01-12 ENCOUNTER — OFFICE VISIT (OUTPATIENT)
Dept: FAMILY MEDICINE CLINIC | Facility: CLINIC | Age: 54
End: 2024-01-12
Payer: COMMERCIAL

## 2024-01-12 VITALS
DIASTOLIC BLOOD PRESSURE: 78 MMHG | BODY MASS INDEX: 20.77 KG/M2 | SYSTOLIC BLOOD PRESSURE: 108 MMHG | HEART RATE: 124 BPM | OXYGEN SATURATION: 99 % | WEIGHT: 117.2 LBS | HEIGHT: 63 IN | TEMPERATURE: 98.6 F

## 2024-01-12 DIAGNOSIS — M25.561 ACUTE PAIN OF RIGHT KNEE: Primary | ICD-10-CM

## 2024-01-12 PROCEDURE — 99213 OFFICE O/P EST LOW 20 MIN: CPT | Performed by: NURSE PRACTITIONER

## 2024-01-12 RX ORDER — PREDNISONE 20 MG/1
TABLET ORAL
Qty: 9 TABLET | Refills: 0 | Status: SHIPPED | OUTPATIENT
Start: 2024-01-12 | End: 2024-01-17

## 2024-01-12 NOTE — PROGRESS NOTES
"Chief Complaint  Knee Pain (Right knee, ran into a kitchen chair about a month ago)    Subjective        Radha Vasquez presents to [facility]  History of Present Illness    Patient presents today with complaint of right knee pain that started a month ago.  Ran into a kitchen chair and hit it.      Doesn't ache all the time.  Does have weakness in leg with it is touched or bent a certain way laterally.      Objective   Vital Signs:  /78   Pulse (!) 124   Temp 98.6 °F (37 °C)   Ht 160 cm (63\")   Wt 53.2 kg (117 lb 3.2 oz)   SpO2 99%   BMI 20.76 kg/m²   Estimated body mass index is 20.76 kg/m² as calculated from the following:    Height as of this encounter: 160 cm (63\").    Weight as of this encounter: 53.2 kg (117 lb 3.2 oz).       BMI is within normal parameters. No other follow-up for BMI required.      Physical Exam  Constitutional:       Appearance: Normal appearance.   Cardiovascular:      Rate and Rhythm: Normal rate and regular rhythm.      Pulses: Normal pulses.   Pulmonary:      Effort: Pulmonary effort is normal.      Breath sounds: Normal breath sounds.   Musculoskeletal:      Right knee: Tenderness present over the lateral joint line.   Skin:     General: Skin is warm and dry.   Neurological:      Mental Status: She is alert.   Psychiatric:         Mood and Affect: Mood normal.         Behavior: Behavior normal.         Thought Content: Thought content normal.         Judgment: Judgment normal.        Result Review :                   Assessment and Plan   Diagnoses and all orders for this visit:    1. Acute pain of right knee (Primary)  -     Ambulatory Referral to Orthopedic Surgery    Other orders  -     predniSONE (DELTASONE) 20 MG tablet; Take 1 tablet by mouth 2 (Two) Times a Day for 3 days, THEN 1 tablet Daily for 3 days.  Dispense: 9 tablet; Refill: 0    Will trial prednisone for inflammation.  Because of severe tenderness over the lateral joint " line I would like to refer on to orthopedic surgery for further evaluation and treatment.  Patient is agreeable to this.  Referral made.         Follow Up   No follow-ups on file.  Patient was given instructions and counseling regarding her condition or for health maintenance advice. Please see specific information pulled into the AVS if appropriate.

## 2024-01-15 RX ORDER — ESCITALOPRAM OXALATE 10 MG/1
10 TABLET ORAL DAILY
Qty: 90 TABLET | Refills: 1 | Status: SHIPPED | OUTPATIENT
Start: 2024-01-15

## 2024-01-16 ENCOUNTER — OFFICE VISIT (OUTPATIENT)
Dept: ORTHOPEDIC SURGERY | Facility: CLINIC | Age: 54
End: 2024-01-16
Payer: COMMERCIAL

## 2024-01-16 VITALS
WEIGHT: 117 LBS | DIASTOLIC BLOOD PRESSURE: 68 MMHG | SYSTOLIC BLOOD PRESSURE: 106 MMHG | BODY MASS INDEX: 20.73 KG/M2 | HEIGHT: 63 IN | HEART RATE: 81 BPM

## 2024-01-16 DIAGNOSIS — M25.561 RIGHT KNEE PAIN, UNSPECIFIED CHRONICITY: Primary | ICD-10-CM

## 2024-01-16 DIAGNOSIS — M22.2X1 PATELLOFEMORAL PAIN SYNDROME OF RIGHT KNEE: ICD-10-CM

## 2024-01-16 PROCEDURE — 99214 OFFICE O/P EST MOD 30 MIN: CPT | Performed by: NURSE PRACTITIONER

## 2024-01-16 PROCEDURE — 1160F RVW MEDS BY RX/DR IN RCRD: CPT | Performed by: NURSE PRACTITIONER

## 2024-01-16 PROCEDURE — 1159F MED LIST DOCD IN RCRD: CPT | Performed by: NURSE PRACTITIONER

## 2024-01-16 RX ORDER — METHYLPREDNISOLONE 4 MG/1
TABLET ORAL
Qty: 21 TABLET | Refills: 0 | COMMUNITY
Start: 2024-01-16

## 2024-01-16 NOTE — PROGRESS NOTES
Subjective:     Patient ID: Radha Vasquez is a 53 y.o. female.    Chief Complaint:  Right knee injury, new patient to examiner  History of Present Illness  Radha Vasquez 53-year-old female presents to clinic for evaluation of her right knee.  Approximately month and a half ago she was at her home when she ran into a chair at her home is continue to experience pain ever since.  She has been to see her primary care who recommended follow-up in our office.  Maximal tenderness present along the anterior and lateral aspect of the knee increased pain noted with kneeling or any kind of pressure applied to the anterior aspect of the knee she is experiencing a sharp shooting stabbing burning sensation pain also present when she gets into a deep squat she is required to extend the knee out.  Denies any prior injury to the knee in the past denies any prior x-ray or MRI.  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  Denies any other concerns present.     Social History     Occupational History    Not on file   Tobacco Use    Smoking status: Heavy Smoker     Packs/day: 1.00     Years: 15.00     Additional pack years: 0.00     Total pack years: 15.00     Types: Cigarettes     Passive exposure: Current    Smokeless tobacco: Never   Vaping Use    Vaping Use: Never used   Substance and Sexual Activity    Alcohol use: Yes     Comment: rare    Drug use: Never    Sexual activity: Defer      Past Medical History:   Diagnosis Date    Acute sinusitis     Cough     Pap smear for cervical cancer screening     Visit for screening mammogram     Wheezing      Past Surgical History:   Procedure Laterality Date    COLONOSCOPY      CYST REMOVAL      on throat    TUBAL ABDOMINAL LIGATION         Family History   Problem Relation Age of Onset    Hypertension Mother     Osteoporosis Mother                Objective:  Physical Exam    Vital  "signs reviewed.   General: No acute distress.  Eyes: conjunctiva clear; pupils equally round and reactive  ENT: external ears and nose atraumatic; oropharynx clear  CV: no peripheral edema  Resp: normal respiratory effort  Skin: no rashes or wounds; normal turgor  Psych: mood and affect appropriate; recent and remote memory intact    Vitals:    01/16/24 1452   BP: 106/68   Pulse: 81   Weight: 53.1 kg (117 lb)   Height: 160 cm (63\")         01/16/24  1452   Weight: 53.1 kg (117 lb)     Body mass index is 20.73 kg/m².      Right Knee Exam     Tenderness   The patient is experiencing tenderness in the patella and lateral joint line.    Range of Motion   Extension:  0   Flexion:  130     Tests   Rebecca:  Medial - negative Lateral - positive  Varus: negative Valgus: negative  Lachman:  Anterior - 1+      Drawer:  Anterior - negative      Patellar apprehension: positive    Other   Erythema: absent  Sensation: normal  Pulse: present  Swelling: mild  Effusion: no effusion present    Comments:  Positive active patellar compression test                 Imaging:  Right Knee X-Ray  Indication: Pain    AP, Lateral, and [place] views    Findings:  No fracture  No bony lesion  Normal soft tissues  Mild medial compartment narrowing     No prior studies were available for comparison.    Assessment:        1. Right knee pain, unspecified chronicity    2. Patellofemoral pain syndrome of right knee           Plan:  Discussed plan of care with patient.  We did discuss quad strengthening hamstring strengthening calf strengthening she did receive exercises.  We will start oral steroid taper I do recommend topical anti-inflammatory including diclofenac 3-4 times a day.  Encouraged to continue with exercises I will see her back in clinic in 4 weeks if she is not improving.  We will likely proceed with advanced imaging at that time if she continues to be symptomatic or feels as if the knee is giving way.  Encouraged to call with any " questions or concerns gladly see her back in clinic sooner if needed.  All questions answered.  Orders:  Orders Placed This Encounter   Procedures    XR Knee 3 View Right     New Medications Ordered This Visit   Medications    Diclofenac Sodium (VOLTAREN) 1 % gel gel     Sig: Apply 4 g topically to the appropriate area as directed 4 (Four) Times a Day.     Dispense:  150 g     Refill:  3       I ordered and reviewed the NOEMÍ today.       Dragon dictation utilized

## 2024-01-17 ENCOUNTER — PATIENT ROUNDING (BHMG ONLY) (OUTPATIENT)
Dept: ORTHOPEDIC SURGERY | Facility: CLINIC | Age: 54
End: 2024-01-17
Payer: COMMERCIAL

## 2024-01-17 NOTE — PROGRESS NOTES
A My-Chart message has been sent to the patient for PATIENT ROUNDING with [facility].

## 2024-06-14 ENCOUNTER — OFFICE VISIT (OUTPATIENT)
Dept: FAMILY MEDICINE CLINIC | Facility: CLINIC | Age: 54
End: 2024-06-14
Payer: COMMERCIAL

## 2024-06-14 VITALS
HEART RATE: 82 BPM | DIASTOLIC BLOOD PRESSURE: 60 MMHG | OXYGEN SATURATION: 96 % | WEIGHT: 113.2 LBS | HEIGHT: 63 IN | BODY MASS INDEX: 20.06 KG/M2 | SYSTOLIC BLOOD PRESSURE: 106 MMHG

## 2024-06-14 DIAGNOSIS — Z12.31 ENCOUNTER FOR SCREENING MAMMOGRAM FOR MALIGNANT NEOPLASM OF BREAST: ICD-10-CM

## 2024-06-14 DIAGNOSIS — M25.539 PAIN OF RADIAL SIDE OF WRIST: Primary | ICD-10-CM

## 2024-06-14 DIAGNOSIS — Z12.4 SCREENING FOR CERVICAL CANCER: ICD-10-CM

## 2024-06-14 DIAGNOSIS — Z12.11 SCREENING FOR COLON CANCER: ICD-10-CM

## 2024-06-14 DIAGNOSIS — F17.210 CIGARETTE NICOTINE DEPENDENCE WITHOUT COMPLICATION: ICD-10-CM

## 2024-06-14 DIAGNOSIS — Z13.220 SCREENING FOR LIPID DISORDERS: ICD-10-CM

## 2024-06-14 PROCEDURE — 1125F AMNT PAIN NOTED PAIN PRSNT: CPT | Performed by: NURSE PRACTITIONER

## 2024-06-14 PROCEDURE — 99214 OFFICE O/P EST MOD 30 MIN: CPT | Performed by: NURSE PRACTITIONER

## 2024-06-14 RX ORDER — VARENICLINE TARTRATE 1 MG/1
1 TABLET, FILM COATED ORAL 2 TIMES DAILY
Qty: 60 TABLET | Refills: 4 | Status: SHIPPED | OUTPATIENT
Start: 2024-06-14

## 2024-06-14 RX ORDER — VARENICLINE TARTRATE 0.5 MG/1
0.5 TABLET, FILM COATED ORAL 2 TIMES DAILY
Qty: 60 TABLET | Refills: 0 | Status: SHIPPED | OUTPATIENT
Start: 2024-06-14

## 2024-06-14 RX ORDER — METHYLPREDNISOLONE 4 MG/1
TABLET ORAL
Qty: 21 TABLET | Refills: 0 | Status: SHIPPED | OUTPATIENT
Start: 2024-06-14

## 2024-06-14 NOTE — PROGRESS NOTES
"Chief Complaint  Wrist Pain (Pt hurt wrist putting together trampoline, felt like something happened when she pulled really hard.  did xrays no broken bones. )    Subjective        Radha Vasquez presents to [facility]  History of Present Illness    Patient is here today for follow up on wrist pain.  Started after putting together trampoline 5/15/2024 went to  and had no fracture.      Hurts all the time since.   Lifting, certain movements,    Denies weakness in that hand.    Medially pain, sometimes burning.   Pain shoots up into arm. Denies numbness, tingling into arm.    Didn't do any treatment with andrews. She has been wearing a brace on it off and on.      Cannot lift grandddaughter    Objective   Vital Signs:  /60   Pulse 82   Ht 160 cm (62.99\")   Wt 51.3 kg (113 lb 3.2 oz)   SpO2 96%   BMI 20.06 kg/m²   Estimated body mass index is 20.06 kg/m² as calculated from the following:    Height as of this encounter: 160 cm (62.99\").    Weight as of this encounter: 51.3 kg (113 lb 3.2 oz).       BMI is within normal parameters. No other follow-up for BMI required.      Physical Exam  Constitutional:       Appearance: Normal appearance.   Cardiovascular:      Rate and Rhythm: Normal rate and regular rhythm.      Pulses: Normal pulses.   Pulmonary:      Effort: Pulmonary effort is normal.      Breath sounds: Normal breath sounds.   Skin:     General: Skin is warm and dry.   Neurological:      Mental Status: She is alert.   Psychiatric:         Mood and Affect: Mood normal.         Behavior: Behavior normal.         Thought Content: Thought content normal.         Judgment: Judgment normal.        Result Review :                     Assessment and Plan     Diagnoses and all orders for this visit:    1. Pain of radial side of wrist (Primary)  -     Ambulatory Referral to Hand Surgery    2. Screening for cervical cancer  -     Ambulatory Referral to Obstetrics / " Gynecology    3. Encounter for screening mammogram for malignant neoplasm of breast  -     Mammo Screening Digital Tomosynthesis Bilateral With CAD; Future    4. Screening for lipid disorders  -     CBC & Differential  -     Comprehensive Metabolic Panel  -     Lipid Panel  -     TSH    5. Screening for colon cancer  -     Ambulatory Referral For Screening Colonoscopy    6. Cigarette nicotine dependence without complication  -     CBC & Differential  -     Comprehensive Metabolic Panel  -     Lipid Panel  -     TSH    Other orders  -     methylPREDNISolone (MEDROL) 4 MG dose pack; Use as directed by package instructions  Dispense: 21 tablet; Refill: 0  -     varenicline (Chantix) 0.5 MG tablet; Take 1 tablet by mouth 2 (Two) Times a Day.  Dispense: 60 tablet; Refill: 0  -     varenicline (Chantix Continuing Month Cholo) 1 MG tablet; Take 1 tablet by mouth 2 (Two) Times a Day.  Dispense: 60 tablet; Refill: 4      Referral to hand specialist for further evaluation of chronic pain of hand.      Referral to ob/gyn for PAP    Order for mammogram    Will check labs when fasting for lipids    Tobacco Use: High Risk (6/14/2024)    Patient History     Smoking Tobacco Use: Heavy Smoker     Smokeless Tobacco Use: Never     Passive Exposure: Current     Radha Vasquez  reports that she has been smoking cigarettes. She has a 15 pack-year smoking history. She has been exposed to tobacco smoke. She has never used smokeless tobacco. I have educated her on the risk of diseases from using tobacco products such as cancer, COPD, and heart disease.     I advised her to quit and she is willing to quit. We have discussed the following method/s for tobacco cessation:  Prescription Medication.  Together we have set a quit date for 1 month from today.  She will follow up with me in several months or sooner to check on her progress.    I spent 4 minutes counseling the patient.     Patient declines vaccine update           Follow Up      No follow-ups on file.  Patient was given instructions and counseling regarding her condition or for health maintenance advice. Please see specific information pulled into the AVS if appropriate.

## 2024-06-15 LAB
ALBUMIN SERPL-MCNC: 4.4 G/DL (ref 3.8–4.9)
ALP SERPL-CCNC: 105 IU/L (ref 44–121)
ALT SERPL-CCNC: 8 IU/L (ref 0–32)
AST SERPL-CCNC: 15 IU/L (ref 0–40)
BASOPHILS # BLD AUTO: 0.1 X10E3/UL (ref 0–0.2)
BASOPHILS NFR BLD AUTO: 1 %
BILIRUB SERPL-MCNC: <0.2 MG/DL (ref 0–1.2)
BUN SERPL-MCNC: 14 MG/DL (ref 6–24)
BUN/CREAT SERPL: 15 (ref 9–23)
CALCIUM SERPL-MCNC: 9.9 MG/DL (ref 8.7–10.2)
CHLORIDE SERPL-SCNC: 102 MMOL/L (ref 96–106)
CHOLEST SERPL-MCNC: 234 MG/DL (ref 100–199)
CO2 SERPL-SCNC: 23 MMOL/L (ref 20–29)
CREAT SERPL-MCNC: 0.94 MG/DL (ref 0.57–1)
EGFRCR SERPLBLD CKD-EPI 2021: 73 ML/MIN/1.73
EOSINOPHIL # BLD AUTO: 0.2 X10E3/UL (ref 0–0.4)
EOSINOPHIL NFR BLD AUTO: 3 %
ERYTHROCYTE [DISTWIDTH] IN BLOOD BY AUTOMATED COUNT: 13.3 % (ref 11.7–15.4)
GLOBULIN SER CALC-MCNC: 2.5 G/DL (ref 1.5–4.5)
GLUCOSE SERPL-MCNC: 90 MG/DL (ref 70–99)
HCT VFR BLD AUTO: 37.9 % (ref 34–46.6)
HDLC SERPL-MCNC: 40 MG/DL
HGB BLD-MCNC: 12.4 G/DL (ref 11.1–15.9)
IMM GRANULOCYTES # BLD AUTO: 0 X10E3/UL (ref 0–0.1)
IMM GRANULOCYTES NFR BLD AUTO: 0 %
LDLC SERPL CALC-MCNC: 145 MG/DL (ref 0–99)
LYMPHOCYTES # BLD AUTO: 3 X10E3/UL (ref 0.7–3.1)
LYMPHOCYTES NFR BLD AUTO: 41 %
MCH RBC QN AUTO: 30 PG (ref 26.6–33)
MCHC RBC AUTO-ENTMCNC: 32.7 G/DL (ref 31.5–35.7)
MCV RBC AUTO: 92 FL (ref 79–97)
MONOCYTES # BLD AUTO: 0.5 X10E3/UL (ref 0.1–0.9)
MONOCYTES NFR BLD AUTO: 6 %
NEUTROPHILS # BLD AUTO: 3.5 X10E3/UL (ref 1.4–7)
NEUTROPHILS NFR BLD AUTO: 49 %
PLATELET # BLD AUTO: 322 X10E3/UL (ref 150–450)
POTASSIUM SERPL-SCNC: 4.3 MMOL/L (ref 3.5–5.2)
PROT SERPL-MCNC: 6.9 G/DL (ref 6–8.5)
RBC # BLD AUTO: 4.13 X10E6/UL (ref 3.77–5.28)
SODIUM SERPL-SCNC: 139 MMOL/L (ref 134–144)
TRIGL SERPL-MCNC: 269 MG/DL (ref 0–149)
TSH SERPL DL<=0.005 MIU/L-ACNC: 0.79 UIU/ML (ref 0.45–4.5)
VLDLC SERPL CALC-MCNC: 49 MG/DL (ref 5–40)
WBC # BLD AUTO: 7.3 X10E3/UL (ref 3.4–10.8)

## 2024-06-17 ENCOUNTER — TELEPHONE (OUTPATIENT)
Dept: SURGERY | Facility: CLINIC | Age: 54
End: 2024-06-17

## 2024-06-17 NOTE — PROGRESS NOTES
Please call patient with results of labs.  Let her know that her cholesterol has went up.  It is not recommended based on her risk factor that she start a cholesterol medicine at this point.  But she needs to work hard on decreasing saturated fats, fried foods, and fast foods in diet.  All other labs are stable.

## 2024-07-08 DIAGNOSIS — M25.539 PAIN OF RADIAL SIDE OF WRIST: Primary | ICD-10-CM

## 2024-07-31 RX ORDER — ESCITALOPRAM OXALATE 10 MG/1
10 TABLET ORAL DAILY
Qty: 90 TABLET | Refills: 1 | Status: SHIPPED | OUTPATIENT
Start: 2024-07-31

## 2024-07-31 RX ORDER — PANTOPRAZOLE SODIUM 40 MG/1
40 TABLET, DELAYED RELEASE ORAL DAILY
Qty: 90 TABLET | Refills: 3 | Status: SHIPPED | OUTPATIENT
Start: 2024-07-31

## 2024-08-05 RX ORDER — LEVOCETIRIZINE DIHYDROCHLORIDE 5 MG/1
5 TABLET, FILM COATED ORAL EVERY EVENING
Qty: 90 TABLET | Refills: 3 | Status: SHIPPED | OUTPATIENT
Start: 2024-08-05

## 2024-10-29 NOTE — TELEPHONE ENCOUNTER
Rx Refill Note  Requested Prescriptions     Pending Prescriptions Disp Refills    famotidine (PEPCID) 20 MG tablet [Pharmacy Med Name: FAMOTIDINE 20 MG TABLET] 180 tablet 3     Sig: TAKE 1 TABLET BY MOUTH TWICE A DAY      Last office visit with prescribing clinician: 6/14/2024   Last telemedicine visit with prescribing clinician: Visit date not found   Next office visit with prescribing clinician: Visit date not found                         Would you like a call back once the refill request has been completed: [] Yes [] No    If the office needs to give you a call back, can they leave a voicemail: [] Yes [] No    Ruth Ann Gallardo MA  10/29/24, 08:07 EDT

## 2024-10-30 RX ORDER — FAMOTIDINE 20 MG/1
20 TABLET, FILM COATED ORAL 2 TIMES DAILY
Qty: 180 TABLET | Refills: 3 | Status: SHIPPED | OUTPATIENT
Start: 2024-10-30

## 2024-12-20 ENCOUNTER — TELEPHONE (OUTPATIENT)
Dept: FAMILY MEDICINE CLINIC | Facility: CLINIC | Age: 54
End: 2024-12-20
Payer: COMMERCIAL

## 2025-01-27 RX ORDER — ESCITALOPRAM OXALATE 10 MG/1
10 TABLET ORAL DAILY
Qty: 90 TABLET | Refills: 1 | Status: SHIPPED | OUTPATIENT
Start: 2025-01-27

## 2025-01-27 NOTE — TELEPHONE ENCOUNTER
Rx Refill Note  Requested Prescriptions     Pending Prescriptions Disp Refills    escitalopram (LEXAPRO) 10 MG tablet [Pharmacy Med Name: ESCITALOPRAM 10 MG TABLET] 90 tablet 1     Sig: TAKE 1 TABLET BY MOUTH DAILY      Last office visit with prescribing clinician: 6/14/2024   Last telemedicine visit with prescribing clinician: Visit date not found   Next office visit with prescribing clinician: Visit date not found                         Would you like a call back once the refill request has been completed: [] Yes [] No    If the office needs to give you a call back, can they leave a voicemail: [] Yes [] No    Ruth Ann Gallardo MA  01/27/25, 07:42 EST

## 2025-03-14 ENCOUNTER — OFFICE VISIT (OUTPATIENT)
Dept: FAMILY MEDICINE CLINIC | Facility: CLINIC | Age: 55
End: 2025-03-14
Payer: COMMERCIAL

## 2025-03-14 VITALS
SYSTOLIC BLOOD PRESSURE: 122 MMHG | DIASTOLIC BLOOD PRESSURE: 68 MMHG | BODY MASS INDEX: 21.76 KG/M2 | WEIGHT: 122.8 LBS | HEIGHT: 63 IN | HEART RATE: 88 BPM | OXYGEN SATURATION: 98 %

## 2025-03-14 DIAGNOSIS — M79.605 PAIN IN BOTH LOWER EXTREMITIES: Primary | ICD-10-CM

## 2025-03-14 DIAGNOSIS — F41.9 ANXIETY: ICD-10-CM

## 2025-03-14 DIAGNOSIS — I10 PRIMARY HYPERTENSION: ICD-10-CM

## 2025-03-14 DIAGNOSIS — M79.604 PAIN IN BOTH LOWER EXTREMITIES: Primary | ICD-10-CM

## 2025-03-14 PROCEDURE — 1125F AMNT PAIN NOTED PAIN PRSNT: CPT | Performed by: NURSE PRACTITIONER

## 2025-03-14 PROCEDURE — 99214 OFFICE O/P EST MOD 30 MIN: CPT | Performed by: NURSE PRACTITIONER

## 2025-03-14 RX ORDER — ESCITALOPRAM OXALATE 20 MG/1
20 TABLET ORAL DAILY
Qty: 30 TABLET | Refills: 2 | Status: SHIPPED | OUTPATIENT
Start: 2025-03-14

## 2025-03-14 RX ORDER — LOSARTAN POTASSIUM 25 MG/1
25 TABLET ORAL DAILY
Qty: 30 TABLET | Refills: 2 | Status: SHIPPED | OUTPATIENT
Start: 2025-03-14

## 2025-03-14 RX ORDER — VARENICLINE TARTRATE 1 MG/1
1 TABLET, FILM COATED ORAL 2 TIMES DAILY
Qty: 60 TABLET | Refills: 4 | Status: SHIPPED | OUTPATIENT
Start: 2025-03-14

## 2025-03-14 NOTE — PROGRESS NOTES
"Chief Complaint  Dizziness, Hypertension, Menopause (Pt thinks she may be going through menopause again. ), and Foot Pain (Tops of feet hurt so bad that it makes shins hurt. Has tried many shoes. )    Subjective        Radha Vasquez presents to [facility]  History of Present Illness      Patient is here today with complaint of menopausal symptoms.  She gets dizzy and lightheaded and having hot flashes.   Went to nurse and had 177/60 or something like that. Has had it several times.       Having increase anxiety      Also having pain in bilateral feet and going up into legs.  Worse after standing for a while and then sitting down or after sitting and then going to standing.        Objective   Vital Signs:  /68   Pulse 88   Ht 160 cm (62.99\")   Wt 55.7 kg (122 lb 12.8 oz)   SpO2 98%   BMI 21.76 kg/m²   Estimated body mass index is 21.76 kg/m² as calculated from the following:    Height as of this encounter: 160 cm (62.99\").    Weight as of this encounter: 55.7 kg (122 lb 12.8 oz).    BMI is within normal parameters. No other follow-up for BMI required.      Physical Exam  Constitutional:       Appearance: Normal appearance.   Cardiovascular:      Rate and Rhythm: Normal rate and regular rhythm.      Pulses: Normal pulses.   Pulmonary:      Effort: Pulmonary effort is normal.      Breath sounds: Normal breath sounds.   Skin:     General: Skin is warm and dry.   Neurological:      Mental Status: She is alert.   Psychiatric:         Mood and Affect: Mood normal.         Behavior: Behavior normal.         Thought Content: Thought content normal.         Judgment: Judgment normal.        Result Review :                Assessment and Plan   Diagnoses and all orders for this visit:    1. Pain in both lower extremities (Primary)  -     US Ankle / Brachial Indices Extremity Complete; Future    2. Primary hypertension    3. Anxiety    Other orders  -     escitalopram (LEXAPRO) " 20 MG tablet; Take 1 tablet by mouth Daily.  Dispense: 30 tablet; Refill: 2  -     losartan (Cozaar) 25 MG tablet; Take 1 tablet by mouth Daily.  Dispense: 30 tablet; Refill: 2  -     varenicline (Chantix Continuing Month Cholo) 1 MG tablet; Take 1 tablet by mouth 2 (Two) Times a Day.  Dispense: 60 tablet; Refill: 4      Would like to obtain ultrasound for further evaluation of pain in bilateral lower extremities.  She is agreeable to this.    Anxiety we will increase Lexapro to 20 mg.  If any issues notify provider    Hypertension we are going to start low-dose of losartan.  I would like her to get a blood pressure cuff and check blood pressures 1-2 times weekly.  If any low readings please let me know.  Will follow-up in 4 weeks for medication management.           Follow Up   Return in about 4 weeks (around 4/11/2025).  Patient was given instructions and counseling regarding her condition or for health maintenance advice. Please see specific information pulled into the AVS if appropriate.

## 2025-03-17 ENCOUNTER — TELEPHONE (OUTPATIENT)
Dept: FAMILY MEDICINE CLINIC | Facility: CLINIC | Age: 55
End: 2025-03-17

## 2025-03-17 NOTE — TELEPHONE ENCOUNTER
Hub staff attempted to follow warm transfer process and was unsuccessful     Caller: Radha Vasquez    Relationship to patient: Self    Best call back number:     Patient is needing: RETURNING CALL FROM THE OFFICE

## 2025-03-24 DIAGNOSIS — M79.605 PAIN IN BOTH LOWER EXTREMITIES: ICD-10-CM

## 2025-03-24 DIAGNOSIS — M79.604 PAIN IN BOTH LOWER EXTREMITIES: ICD-10-CM

## 2025-04-07 ENCOUNTER — OFFICE VISIT (OUTPATIENT)
Age: 55
End: 2025-04-07
Payer: COMMERCIAL

## 2025-04-07 VITALS
WEIGHT: 122.8 LBS | SYSTOLIC BLOOD PRESSURE: 109 MMHG | HEIGHT: 63 IN | HEART RATE: 67 BPM | BODY MASS INDEX: 21.76 KG/M2 | DIASTOLIC BLOOD PRESSURE: 74 MMHG

## 2025-04-07 DIAGNOSIS — R68.89 ABNORMAL ANKLE BRACHIAL INDEX (ABI): Primary | ICD-10-CM

## 2025-04-07 PROCEDURE — 99204 OFFICE O/P NEW MOD 45 MIN: CPT | Performed by: SURGERY

## 2025-04-07 PROCEDURE — 1159F MED LIST DOCD IN RCRD: CPT | Performed by: SURGERY

## 2025-04-07 PROCEDURE — 1160F RVW MEDS BY RX/DR IN RCRD: CPT | Performed by: SURGERY

## 2025-04-07 NOTE — PROGRESS NOTES
"Chief Complaint  Peripheral Vascular Disease    Subjective          HPI: Radha Vasquez presents to [facility] who is referred as a new patient for peripheral arterial disease and abnormal ankle-brachial indices.  She was referred by GERMÁN Amador these were done at a [place] facility.  1 KAMLESH is greater than 1 and the other KAMLESH 0.99.  These were both essentially normal.  Toe pressures were reduced.  History of Present Illness  The patient presents for evaluation of leg circulation.    She reports experiencing intermittent numbness in her feet, which she describes as feeling like they have \"fallen asleep.\" This sensation is more pronounced when her feet are cold. She also experiences numbness in her hands during sleep but does not report any discoloration of her feet. Additionally, she reports severe pain in her legs, particularly when moving her ankles upwards. She has not sought medical attention for these symptoms. She has been diagnosed with degenerative disc disease, which may be contributing to her leg and foot pain. She has no history of vascular or cardiac surgery, heart attack, or lung disease. She is capable of climbing two flights of stairs but experiences shortness of breath. She also reports a fear of falling due to pain in her feet and ankles when ascending or descending stairs. She has noticed calluses forming under her toenails and experiences cramping in her feet and legs at night. She has undergone an KAMLESH test at [facility], which revealed abnormal results on the right side. She recalls an incident where she was outside in mild weather but experienced chills and numbness in her feet.    She admits to smoking approximately one pack of cigarettes per day, although she has recently reduced her intake. Despite this reduction, she has not observed any improvement in her symptoms. She expresses a desire to quit smoking and is currently using " "Chantix as part of her cessation plan.    Supplemental Information  She has a history of tubal ligation and cyst removal from her throat, which was not cancerous and did not require radiation therapy.    SOCIAL HISTORY  The patient smokes about a pack a day but has been smoking less lately. She works as an  in the [email or web address] class.    FAMILY HISTORY  The patient reports no family history of aortic aneurysms.    MEDICATIONS  Chantix    Review of Systems     History Review Reviewed Comments   Past Medical History:  [x]     Past Surgical History: [x]  No prior vascular surgery   Family History: [x]     Social History: [x]  Smokes 1 pack/day     Objective   Vital Signs:  /74   Pulse 67   Ht 160 cm (62.99\")   Wt 55.7 kg (122 lb 12.8 oz)   BMI 21.76 kg/m²   Estimated body mass index is 21.76 kg/m² as calculated from the following:    Height as of this encounter: 160 cm (62.99\").    Weight as of this encounter: 55.7 kg (122 lb 12.8 oz).  BMI is within normal parameters. No other follow-up for BMI required.          Physical Exam  Physical Exam  Femoral pulses are palpable. Pulses in the feet are also palpable.        Result Review :    Common labs          6/14/2024    14:29   Common Labs   Glucose 90    BUN 14    Creatinine 0.94    Sodium 139    Potassium 4.3    Chloride 102    Calcium 9.9    Albumin 4.4    Total Bilirubin <0.2    Alkaline Phosphatase 105    AST (SGOT) 15    ALT (SGPT) 8    WBC 7.3    Hemoglobin 12.4    Hematocrit 37.9    Platelets 322    Total Cholesterol 234    Triglycerides 269    HDL Cholesterol 40    LDL Cholesterol  145      Results  Imaging  KAMLESH on the right was normal, on the left was abnormal with toe pressures being low.     Most notable findings include: Creatinine 0.94, cholesterol 234, triglycerides 269,     Images Reviewed:  US Ankle / Brachial Indices Extremity Complete (03/21/2025)         Radha Vasquez  reports that she has been smoking cigarettes. " She has a 15 pack-year smoking history. She has been exposed to tobacco smoke. She has never used smokeless tobacco..   Tobacco Education/Cessation: Radha Vasquez  reports that she has been smoking cigarettes. She has a 15 pack-year smoking history. She has been exposed to tobacco smoke. She has never used smokeless tobacco. I have educated her on the risk of diseases from using tobacco products such as cancer, COPD, heart disease, and arterial disease.     I advised her to quit and she is willing to quit. We have discussed the following method/s for tobacco cessation:  Education Material and Prescription Medication.   I spent 3  minutes counseling the patient.        Patient or patient representative verbalized consent for the use of Ambient Listening during the visit with  Bhargav Hickman MD for chart documentation. 4/7/2025  14:59 EDT        Assessment and Plan     Assessment & Plan  Abnormal ankle brachial index (KAMLESH)            Assessment & Plan  1.  Abnormal KAMLESH  Her symptoms do not align with a diagnosis of peripheral arterial disease (PAD). The cramping she experiences is likely muscular in nature rather than due to insufficient blood supply. There is no evidence of significant PAD or major arterial blockage, and she is not at risk of limb loss.  Her current medication regimen includes Chantix, but she is not on aspirin or cholesterol-lowering medications. She has a history of smoking but remains relatively young. Her arterial pulses are palpable including all pedal pulses, indicating normal circulation. The reduced blood flow to her toes could be attributed to the small blood vessels in the extremities being more sensitive to temperature fluctuations and other factors, leading to vasoconstriction and dilation. She was advised to keep her feet warm to prevent false positives during testing.    2. Smoking cessation.  She was strongly encouraged to cease smoking due to its detrimental effects on  her health, including the constriction of small blood vessels. The use of SmokeFree.gov was suggested as a potential aid in her smoking cessation efforts.         Follow Up     Return if symptoms worsen or fail to improve.  Patient was given instructions and counseling regarding her condition or for health maintenance advice. Please see specific information pulled into the AVS if appropriate.

## 2025-04-25 RX ORDER — HYDROCHLOROTHIAZIDE 25 MG/1
25 TABLET ORAL DAILY
Qty: 30 TABLET | Refills: 2 | Status: SHIPPED | OUTPATIENT
Start: 2025-04-25

## 2025-04-28 ENCOUNTER — OFFICE VISIT (OUTPATIENT)
Dept: FAMILY MEDICINE CLINIC | Facility: CLINIC | Age: 55
End: 2025-04-28
Payer: COMMERCIAL

## 2025-04-28 VITALS
OXYGEN SATURATION: 98 % | DIASTOLIC BLOOD PRESSURE: 62 MMHG | TEMPERATURE: 97.8 F | WEIGHT: 121.4 LBS | BODY MASS INDEX: 21.51 KG/M2 | HEART RATE: 74 BPM | SYSTOLIC BLOOD PRESSURE: 112 MMHG

## 2025-04-28 DIAGNOSIS — I10 PRIMARY HYPERTENSION: Primary | ICD-10-CM

## 2025-04-28 PROCEDURE — 99213 OFFICE O/P EST LOW 20 MIN: CPT | Performed by: NURSE PRACTITIONER

## 2025-04-28 PROCEDURE — 1125F AMNT PAIN NOTED PAIN PRSNT: CPT | Performed by: NURSE PRACTITIONER

## 2025-04-28 RX ORDER — DICLOFENAC SODIUM 75 MG/1
75 TABLET, DELAYED RELEASE ORAL 2 TIMES DAILY
Qty: 60 TABLET | Refills: 0 | Status: SHIPPED | OUTPATIENT
Start: 2025-04-28

## 2025-04-28 NOTE — PROGRESS NOTES
"Chief Complaint  Primary Care Follow-Up (Feet pain)    Subjective        Radha Vasquez presents to [facility] PRIMARY CARE  History of Present Illness    Patient presents today for follow up on hypertension.      Last visit we started losartan.  Patient had some subsequent swelling so I ordered hctz 25mg.       Pain in feet and legs-referral to vascular after abnormal KAMLESH    Objective   Vital Signs:  /62 (BP Location: Left arm)   Pulse 74   Temp 97.8 °F (36.6 °C)   Wt 55.1 kg (121 lb 6.4 oz)   SpO2 98%   BMI 21.51 kg/m²   Estimated body mass index is 21.51 kg/m² as calculated from the following:    Height as of 4/7/25: 160 cm (62.99\").    Weight as of this encounter: 55.1 kg (121 lb 6.4 oz).    BMI is within normal parameters. No other follow-up for BMI required.      Physical Exam  Constitutional:       Appearance: Normal appearance.   Cardiovascular:      Rate and Rhythm: Normal rate and regular rhythm.      Pulses: Normal pulses.   Pulmonary:      Effort: Pulmonary effort is normal.      Breath sounds: Normal breath sounds.   Skin:     General: Skin is warm and dry.   Neurological:      Mental Status: She is alert.   Psychiatric:         Mood and Affect: Mood normal.         Behavior: Behavior normal.         Thought Content: Thought content normal.         Judgment: Judgment normal.        Result Review :                Assessment and Plan   Diagnoses and all orders for this visit:    1. Primary hypertension (Primary)    Other orders  -     diclofenac (VOLTAREN) 75 MG EC tablet; Take 1 tablet by mouth 2 (Two) Times a Day.  Dispense: 60 tablet; Refill: 0    We are going to switch losartan to hydrochlorothiazide and instructed patient to check blood pressure daily.    Will start diclofenac for pain.  If no resolution of pain follow-up as needed.         Follow Up   No follow-ups on file.  Patient was given instructions and counseling regarding her condition or for health " maintenance advice. Please see specific information pulled into the AVS if appropriate.

## 2025-05-02 DIAGNOSIS — M79.604 PAIN IN BOTH LOWER EXTREMITIES: Primary | ICD-10-CM

## 2025-05-02 DIAGNOSIS — M79.605 PAIN IN BOTH LOWER EXTREMITIES: Primary | ICD-10-CM

## 2025-05-02 RX ORDER — BENAZEPRIL HYDROCHLORIDE 5 MG/1
5 TABLET ORAL DAILY
Qty: 30 TABLET | Refills: 2 | Status: SHIPPED | OUTPATIENT
Start: 2025-05-02

## 2025-06-23 RX ORDER — ESCITALOPRAM OXALATE 20 MG/1
20 TABLET ORAL DAILY
Qty: 90 TABLET | Refills: 0 | Status: SHIPPED | OUTPATIENT
Start: 2025-06-23

## 2025-06-23 NOTE — TELEPHONE ENCOUNTER
Rx Refill Note  Requested Prescriptions     Pending Prescriptions Disp Refills    escitalopram (LEXAPRO) 20 MG tablet [Pharmacy Med Name: ESCITALOPRAM 20 MG TABLET] 30 tablet 2     Sig: TAKE 1 TABLET BY MOUTH DAILY      Last office visit with prescribing clinician: 4/28/2025   Last telemedicine visit with prescribing clinician: Visit date not found   Next office visit with prescribing clinician: Visit date not found                         Would you like a call back once the refill request has been completed: [] Yes [] No    If the office needs to give you a call back, can they leave a voicemail: [] Yes [] No    Ruth Ann Arredondo MA  06/23/25, 09:08 EDT

## 2025-07-18 RX ORDER — PANTOPRAZOLE SODIUM 40 MG/1
40 TABLET, DELAYED RELEASE ORAL DAILY
Qty: 90 TABLET | Refills: 0 | Status: SHIPPED | OUTPATIENT
Start: 2025-07-18

## 2025-07-18 NOTE — TELEPHONE ENCOUNTER
Rx Refill Note  Requested Prescriptions     Pending Prescriptions Disp Refills    pantoprazole (PROTONIX) 40 MG EC tablet [Pharmacy Med Name: PANTOPRAZOLE SOD DR 40 MG TAB] 90 tablet 0     Sig: TAKE 1 TABLET BY MOUTH DAILY      Last office visit with prescribing clinician: 4/28/2025   Last telemedicine visit with prescribing clinician: Visit date not found   Next office visit with prescribing clinician: Visit date not found                         Would you like a call back once the refill request has been completed: [] Yes [] No    If the office needs to give you a call back, can they leave a voicemail: [] Yes [] No    Ruth Ann Gallardo MA  07/18/25, 08:29 EDT

## 2025-08-06 RX ORDER — LEVOCETIRIZINE DIHYDROCHLORIDE 5 MG/1
5 TABLET, FILM COATED ORAL EVERY EVENING
Qty: 90 TABLET | Refills: 3 | Status: SHIPPED | OUTPATIENT
Start: 2025-08-06

## 2025-08-25 ENCOUNTER — OFFICE VISIT (OUTPATIENT)
Dept: FAMILY MEDICINE CLINIC | Facility: CLINIC | Age: 55
End: 2025-08-25
Payer: COMMERCIAL

## 2025-08-25 VITALS
OXYGEN SATURATION: 94 % | DIASTOLIC BLOOD PRESSURE: 64 MMHG | HEIGHT: 63 IN | WEIGHT: 117.4 LBS | BODY MASS INDEX: 20.8 KG/M2 | SYSTOLIC BLOOD PRESSURE: 112 MMHG | HEART RATE: 85 BPM

## 2025-08-25 DIAGNOSIS — S62.102S CLOSED FRACTURE OF LEFT WRIST, SEQUELA: ICD-10-CM

## 2025-08-25 DIAGNOSIS — J02.9 SORE THROAT: ICD-10-CM

## 2025-08-25 DIAGNOSIS — B37.0 THRUSH: ICD-10-CM

## 2025-08-25 DIAGNOSIS — R06.2 WHEEZING: Primary | ICD-10-CM

## 2025-08-25 LAB
EXPIRATION DATE: NORMAL
INTERNAL CONTROL: NORMAL
Lab: NORMAL
S PYO AG THROAT QL: NEGATIVE

## 2025-08-25 RX ORDER — BROMPHENIRAMINE MALEATE, PSEUDOEPHEDRINE HYDROCHLORIDE, AND DEXTROMETHORPHAN HYDROBROMIDE 2; 30; 10 MG/5ML; MG/5ML; MG/5ML
SYRUP ORAL
COMMUNITY
Start: 2025-08-20 | End: 2025-08-25

## 2025-08-25 RX ORDER — FLUTICASONE PROPIONATE 50 UG/1
2 SPRAY, METERED NASAL EVERY 24 HOURS
COMMUNITY
Start: 2025-08-20

## 2025-08-25 RX ORDER — ALBUTEROL SULFATE 90 UG/1
2 INHALANT RESPIRATORY (INHALATION) EVERY 4 HOURS PRN
Qty: 18 G | Refills: 0 | Status: SHIPPED | OUTPATIENT
Start: 2025-08-25

## 2025-08-25 RX ORDER — NYSTATIN 100000 [USP'U]/ML
500000 SUSPENSION ORAL 4 TIMES DAILY
Qty: 180 ML | Refills: 0 | Status: SHIPPED | OUTPATIENT
Start: 2025-08-25

## 2025-08-25 RX ORDER — BENZONATATE 100 MG/1
100 CAPSULE ORAL 3 TIMES DAILY PRN
Qty: 30 CAPSULE | Refills: 0 | Status: SHIPPED | OUTPATIENT
Start: 2025-08-25

## 2025-08-25 RX ORDER — PREDNISONE 20 MG/1
TABLET ORAL
Qty: 9 TABLET | Refills: 0 | Status: SHIPPED | OUTPATIENT
Start: 2025-08-25 | End: 2025-08-31

## 2025-08-25 RX ORDER — DEXTROMETHORPHAN HYDROBROMIDE AND PROMETHAZINE HYDROCHLORIDE 15; 6.25 MG/5ML; MG/5ML
5 SYRUP ORAL 4 TIMES DAILY PRN
Qty: 180 ML | Refills: 0 | Status: SHIPPED | OUTPATIENT
Start: 2025-08-25

## 2025-08-27 DIAGNOSIS — R06.2 WHEEZING: ICD-10-CM
